# Patient Record
Sex: FEMALE | Race: WHITE | Employment: UNEMPLOYED | URBAN - NONMETROPOLITAN AREA
[De-identification: names, ages, dates, MRNs, and addresses within clinical notes are randomized per-mention and may not be internally consistent; named-entity substitution may affect disease eponyms.]

---

## 2020-08-14 ENCOUNTER — TELEPHONE (OUTPATIENT)
Dept: NEUROLOGY | Age: 45
End: 2020-08-14

## 2020-08-14 NOTE — TELEPHONE ENCOUNTER
Received a referral from Dr. Annabelle Koehler office for this patient. Called and spoke with patient to let her know when I have her scheduled an appointment with Dr. Doris Taylor. Patient is aware of the appointment time/date/location.

## 2020-09-08 ENCOUNTER — OFFICE VISIT (OUTPATIENT)
Dept: NEUROLOGY | Age: 45
End: 2020-09-08
Payer: COMMERCIAL

## 2020-09-08 VITALS
DIASTOLIC BLOOD PRESSURE: 82 MMHG | WEIGHT: 293 LBS | HEART RATE: 75 BPM | HEIGHT: 67 IN | SYSTOLIC BLOOD PRESSURE: 144 MMHG | BODY MASS INDEX: 45.99 KG/M2

## 2020-09-08 PROBLEM — F41.9 ANXIETY AND DEPRESSION: Status: ACTIVE | Noted: 2020-09-08

## 2020-09-08 PROBLEM — F44.5 PSEUDOSEIZURE: Status: ACTIVE | Noted: 2020-09-08

## 2020-09-08 PROBLEM — F32.A ANXIETY AND DEPRESSION: Status: ACTIVE | Noted: 2020-09-08

## 2020-09-08 PROCEDURE — 99204 OFFICE O/P NEW MOD 45 MIN: CPT | Performed by: PSYCHIATRY & NEUROLOGY

## 2020-09-08 RX ORDER — PHENYTOIN SODIUM 100 MG/1
100 CAPSULE, EXTENDED RELEASE ORAL 3 TIMES DAILY
COMMUNITY
End: 2020-12-16

## 2020-09-08 RX ORDER — SEMAGLUTIDE 1.34 MG/ML
1.5 INJECTION, SOLUTION SUBCUTANEOUS WEEKLY
COMMUNITY

## 2020-09-08 RX ORDER — EMPAGLIFLOZIN 25 MG/1
25 TABLET, FILM COATED ORAL DAILY
COMMUNITY

## 2020-09-08 RX ORDER — AMLODIPINE BESYLATE 10 MG/1
10 TABLET ORAL DAILY
COMMUNITY

## 2020-09-08 RX ORDER — ATORVASTATIN CALCIUM 20 MG/1
20 TABLET, FILM COATED ORAL DAILY
COMMUNITY

## 2020-09-08 RX ORDER — MELOXICAM 15 MG/1
15 TABLET ORAL 2 TIMES DAILY
COMMUNITY

## 2020-09-08 RX ORDER — ALBUTEROL SULFATE 90 UG/1
2 AEROSOL, METERED RESPIRATORY (INHALATION) EVERY 6 HOURS PRN
COMMUNITY

## 2020-09-08 RX ORDER — CHOLECALCIFEROL (VITAMIN D3) 1250 MCG
1 CAPSULE ORAL WEEKLY
COMMUNITY

## 2020-09-08 SDOH — HEALTH STABILITY: MENTAL HEALTH: HOW OFTEN DO YOU HAVE A DRINK CONTAINING ALCOHOL?: NEVER

## 2020-09-08 NOTE — PROGRESS NOTES

## 2020-09-08 NOTE — PROGRESS NOTES
Chief Complaint   Patient presents with    Consultation     New patient referral from Dr. Michelle Valdovinos to evaluate patient with seizures. Robles Davis is a 41y.o. year old female who is seen for evaluation of seizure like episodes starting in . . The patient is here with her fiance and indicates that she began having spells of shaking and decreased alertness about 2-3 weeks following the death of her mother. The patient may have none in one week and several in another week. She has maybe 4 episodes a week. Often these are triggered by stress or getting overheated. She may have episodes where she stares but if he presses on her collar bone she will come out of it. She may have some tapping of the hand but if he soothes her she will stop. She has had one episode where she was shaking in both arms but was awake and anxious this was happening. She has had generalized convulsive episodes although her fiance indicates that her eyes are closed. She has been. on Dilantin at every increasing doses since . Active Ambulatory Problems     Diagnosis Date Noted    Pseudoseizure 2020    Anxiety and depression 2020     Resolved Ambulatory Problems     Diagnosis Date Noted    No Resolved Ambulatory Problems     Past Medical History:   Diagnosis Date    Anxiety     Asthma     Depression     Diabetes mellitus ([address]     Hypertension     Seizures ([facility] [address] [facility]        Past Surgical History:   Procedure Laterality Date    CARPAL TUNNEL RELEASE       SECTION      CHOLECYSTECTOMY, LAPAROSCOPIC         History reviewed. No pertinent family history.     Allergies   Allergen Reactions    Pcn [Penicillins]        Social History     Socioeconomic History    Marital status:      Spouse name: Not on file    Number of children: Not on file    Years of education: Not on file    Highest education level: Not on file   Occupational History    Not on file   Social Needs    Financial resource strain: Not on file    Food insecurity     Worry: Not on file     Inability: Not on file    Transportation needs     Medical: Not on file     Non-medical: Not on file   Tobacco Use    Smoking status: Never Smoker    Smokeless tobacco: Never Used   Substance and Sexual Activity    Alcohol use: Never     Frequency: Never    Drug use: Never    Sexual activity: Not on file   Lifestyle    Physical activity     Days per week: Not on file     Minutes per session: Not on file    Stress: Not on file   Relationships    Social connections     Talks on phone: Not on file     Gets together: Not on file     Attends Druze service: Not on file     Active member of club or organization: Not on file     Attends meetings of clubs or organizations: Not on file     Relationship status: Not on file    Intimate partner violence     Fear of current or ex partner: Not on file     Emotionally abused: Not on file     Physically abused: Not on file     Forced sexual activity: Not on file   Other Topics Concern    Not on file   Social History Narrative    Not on file       Review of Systems     Constitutional - No fever or chills. No diaphoresis or significant fatigue. HENT -  No tinnitus or significant hearing loss. Eyes - no sudden vision change or eye pain  Respiratory - no significant shortness of breath or cough  Cardiovascular - no chest pain No palpitations or significant leg swelling  Gastrointestinal - no abdominal swelling or pain. Genitourinary - No difficulty urinating, dysuria  Musculoskeletal - no back pain or myalgia. Skin - no color change or rash  Neurologic - yes. . seizures. No lateralizing weakness. Hematologic - no easy bruising or excessive bleeding. Psychiatric - no severe anxiety or nervousness.    All other review of systems are negative.       Current Outpatient Medications   Medication Sig Dispense Refill    phenytoin (DILANTIN) 100 MG ER capsule Take 100 mg by mouth 3 times daily      metFORMIN (GLUCOPHAGE) 1000 MG tablet Take 1,000 mg by mouth 2 times daily (with meals)      amLODIPine (NORVASC) 10 MG tablet Take 10 mg by mouth daily      albuterol sulfate  (90 Base) MCG/ACT inhaler Inhale 2 puffs into the lungs every 6 hours as needed for Wheezing      Semaglutide, 1 MG/DOSE, (OZEMPIC, 1 MG/DOSE,) 2 MG/1.5ML SOPN Inject 1.5 mLs into the skin once a week      atorvastatin (LIPITOR) 20 MG tablet Take 20 mg by mouth daily      Cholecalciferol (VITAMIN D3) 1.25 MG (73385 UT) CAPS Take 1 capsule by mouth once a week      empagliflozin (JARDIANCE) 25 MG tablet Take 25 mg by mouth daily      meloxicam (MOBIC) 15 MG tablet Take 15 mg by mouth 2 times daily       No current facility-administered medications for this visit. BP (!) 144/82   Pulse 75   Ht 5' 7\" (1.702 m)   Wt (!) 315 lb (142.9 kg)   BMI 49.34 kg/m²     Constitutional - well developed, well nourished. Eyes - conjunctiva normal.  Pupils not tested  Ear, nose, throat -hearing intact to finger rub No scars, masses, or lesions over external nose or ears, no atrophy of tongue  Neck-symmetric, no masses noted, no jugular vein distension  Respiration- chest wall appears symmetric, good expansion,   normal effort without use of accessory muscles  Musculoskeletal - no significant wasting of muscles noted, no bony deformities  Extremities-no clubbing, cyanosis or edema  Skin - warm, dry, and intact. No rash, erythema, or pallor.   Psychiatric - mood, affect, and behavior flat    Neurological exam  Awake,  fluent oriented x 3 flat affect slow  Attention and concentration appear impaired  Recent and remote memory appears impaired  Speech normal without dysarthria  No clear issues with language of fund of knowledge    Cranial Nerve Exam   CN II- Visual fields grossly unremarkable  CN III, IV,VI-EOMI, No nystagmus, conjugate eye movements, no ptosis  CN V-sensation intact to LT over face  CN VII-no facial assymetry  CN VIII-Hearing intact to finger rub  CN IX and X- Palate not tested  CN XI-not test shoulder shrug  CN XII-Tongue midline with no fasciculations or fibrillations    Motor Exam  V/V throughout upper and lower extremities bilaterally, no cogwheeling, normal tone    Sensory Exam  Sensation intact to light touch and temperature upper and lower extremities bilaterally    Reflexes   2+ biceps bilaterally  2+ brachioradialis  2+patella  2+ ankle jerks  No clonus ankles  No Fierro's sign bilateral hands    Tremors- no tremors in hands or head noted    Gait  Normal base and speed  No ataxia    Coordination  Finger to nose-unremarkable    No results found for: [id number]  No results found for: WBC, HGB, HCT, MCV, PLT  No results found for: NA, K, CL, CO2, BUN, CREATININE, GLUCOSE, CALCIUM, PROT, LABALBU, BILITOT, ALKPHOS, AST, ALT, LABGLOM, GFRAA, AGRATIO, GLOB        Assessment    ICD-10-CM    1. [address] [facility], [place]   2. Anxiety and depression  F41.9     F32.9        Her neurological examination today was unremarkable. She did have a slow and flat affect. Based upon her history and examination she appears to have pseudoseizures. Her MRI of the brain which I reviewed was relatively unremarkable. At this time she was instructed to slowly wean off dilantin by dropping one pill every 2 weeks till she is off. She will also be referred to psychiatry. The patient and fiance indicated understanding of the management plan. She is to follow up with me on a PRN basis and call with any issues. Plan    Orders Placed This Encounter   Procedures   [facility], [place]       No orders of the defined types were placed in this encounter. Return if symptoms worsen or fail to improve. EMR Dragon/transcription disclaimer:Significant part of this  encounter note is electronic transcription/translation of spoken language to printed text.  The electronic translation of spoken language may be erroneous, or at times, nonsensical words or phrases may be inadvertently transcribed.  Although I have reviewed the note for such errors, some may still exist.

## 2020-09-25 ENCOUNTER — OFFICE VISIT (OUTPATIENT)
Dept: PSYCHIATRY | Age: 45
End: 2020-09-25
Payer: COMMERCIAL

## 2020-09-25 PROCEDURE — 90791 PSYCH DIAGNOSTIC EVALUATION: CPT | Performed by: SOCIAL WORKER

## 2020-09-25 SDOH — ECONOMIC STABILITY: HOUSING INSECURITY: IN THE LAST 12 MONTHS, HOW MANY PLACES HAVE YOU LIVED?: 2

## 2020-09-25 SDOH — ECONOMIC STABILITY: FOOD INSECURITY: WITHIN THE PAST 12 MONTHS, YOU WORRIED THAT YOUR FOOD WOULD RUN OUT BEFORE YOU GOT MONEY TO BUY MORE.: SOMETIMES TRUE

## 2020-09-25 SDOH — HEALTH STABILITY: PHYSICAL HEALTH: ON AVERAGE, HOW MANY MINUTES DO YOU ENGAGE IN EXERCISE AT THIS LEVEL?: 60 MIN

## 2020-09-25 SDOH — SOCIAL STABILITY: SOCIAL INSECURITY
WITHIN THE LAST YEAR, HAVE TO BEEN RAPED OR FORCED TO HAVE ANY KIND OF SEXUAL ACTIVITY BY YOUR PARTNER OR EX-PARTNER?: NO

## 2020-09-25 SDOH — SOCIAL STABILITY: SOCIAL NETWORK
DO YOU BELONG TO ANY CLUBS OR ORGANIZATIONS SUCH AS CHURCH GROUPS UNIONS, FRATERNAL OR ATHLETIC GROUPS, OR SCHOOL GROUPS?: YES

## 2020-09-25 SDOH — SOCIAL STABILITY: SOCIAL NETWORK: HOW OFTEN DO YOU GET TOGETHER WITH FRIENDS OR RELATIVES?: THREE TIMES A WEEK

## 2020-09-25 SDOH — SOCIAL STABILITY: SOCIAL INSECURITY: WITHIN THE LAST YEAR, HAVE YOU BEEN AFRAID OF YOUR PARTNER OR EX-PARTNER?: NO

## 2020-09-25 SDOH — ECONOMIC STABILITY: INCOME INSECURITY: IN THE LAST 12 MONTHS, WAS THERE A TIME WHEN YOU WERE NOT ABLE TO PAY THE MORTGAGE OR RENT ON TIME?: NO

## 2020-09-25 SDOH — SOCIAL STABILITY: SOCIAL NETWORK: HOW OFTEN DO YOU ATTENT MEETINGS OF THE CLUB OR ORGANIZATION YOU BELONG TO?: MORE THAN 4 TIMES PER YEAR

## 2020-09-25 SDOH — ECONOMIC STABILITY: TRANSPORTATION INSECURITY
IN THE PAST 12 MONTHS, HAS THE LACK OF TRANSPORTATION KEPT YOU FROM MEDICAL APPOINTMENTS OR FROM GETTING MEDICATIONS?: YES

## 2020-09-25 SDOH — SOCIAL STABILITY: SOCIAL INSECURITY: WITHIN THE LAST YEAR, HAVE YOU BEEN HUMILIATED OR EMOTIONALLY ABUSED IN OTHER WAYS BY YOUR PARTNER OR EX-PARTNER?: NO

## 2020-09-25 SDOH — SOCIAL STABILITY: SOCIAL NETWORK: HOW OFTEN DO YOU ATTEND CHURCH OR RELIGIOUS SERVICES?: MORE THAN 4 TIMES PER YEAR

## 2020-09-25 SDOH — ECONOMIC STABILITY: INCOME INSECURITY: HOW HARD IS IT FOR YOU TO PAY FOR THE VERY BASICS LIKE FOOD, HOUSING, MEDICAL CARE, AND HEATING?: SOMEWHAT HARD

## 2020-09-25 SDOH — SOCIAL STABILITY: SOCIAL INSECURITY
WITHIN THE LAST YEAR, HAVE YOU BEEN KICKED, HIT, SLAPPED, OR OTHERWISE PHYSICALLY HURT BY YOUR PARTNER OR EX-PARTNER?: NO

## 2020-09-25 SDOH — SOCIAL STABILITY: SOCIAL NETWORK: ARE YOU MARRIED, WIDOWED, DIVORCED, SEPARATED, NEVER MARRIED, OR LIVING WITH A PARTNER?: MARRIED

## 2020-09-25 SDOH — SOCIAL STABILITY: SOCIAL NETWORK
IN A TYPICAL WEEK, HOW MANY TIMES DO YOU TALK ON THE PHONE WITH FAMILY, FRIENDS, OR NEIGHBORS?: MORE THAN THREE TIMES A WEEK

## 2020-09-25 SDOH — ECONOMIC STABILITY: FOOD INSECURITY: WITHIN THE PAST 12 MONTHS, THE FOOD YOU BOUGHT JUST DIDN'T LAST AND YOU DIDN'T HAVE MONEY TO GET MORE.: NEVER TRUE

## 2020-09-25 SDOH — ECONOMIC STABILITY: TRANSPORTATION INSECURITY
IN THE PAST 12 MONTHS, HAS LACK OF TRANSPORTATION KEPT YOU FROM MEETINGS, WORK, OR FROM GETTING THINGS NEEDED FOR DAILY LIVING?: NO

## 2020-09-25 SDOH — HEALTH STABILITY: PHYSICAL HEALTH: ON AVERAGE, HOW MANY DAYS PER WEEK DO YOU ENGAGE IN MODERATE TO STRENUOUS EXERCISE (LIKE A BRISK WALK)?: 3 DAYS

## 2020-09-25 SDOH — HEALTH STABILITY: MENTAL HEALTH
STRESS IS WHEN SOMEONE FEELS TENSE, NERVOUS, ANXIOUS, OR CAN'T SLEEP AT NIGHT BECAUSE THEIR MIND IS TROUBLED. HOW STRESSED ARE YOU?: VERY MUCH

## 2020-09-25 SDOH — ECONOMIC STABILITY: HOUSING INSECURITY
IN THE LAST 12 MONTHS, WAS THERE A TIME WHEN YOU DID NOT HAVE A STEADY PLACE TO SLEEP OR SLEPT IN A SHELTER (INCLUDING NOW)?: NO

## 2020-09-25 ASSESSMENT — PATIENT HEALTH QUESTIONNAIRE - PHQ9
SUM OF ALL RESPONSES TO PHQ QUESTIONS 1-9: 11
6. FEELING BAD ABOUT YOURSELF - OR THAT YOU ARE A FAILURE OR HAVE LET YOURSELF OR YOUR FAMILY DOWN: 0
2. FEELING DOWN, DEPRESSED OR HOPELESS: 1
5. POOR APPETITE OR OVEREATING: 0
3. TROUBLE FALLING OR STAYING ASLEEP: 1
SUM OF ALL RESPONSES TO PHQ QUESTIONS 1-9: 11
4. FEELING TIRED OR HAVING LITTLE ENERGY: 2
9. THOUGHTS THAT YOU WOULD BE BETTER OFF DEAD, OR OF HURTING YOURSELF: 1
7. TROUBLE CONCENTRATING ON THINGS, SUCH AS READING THE NEWSPAPER OR WATCHING TELEVISION: 2
1. LITTLE INTEREST OR PLEASURE IN DOING THINGS: 2
8. MOVING OR SPEAKING SO SLOWLY THAT OTHER PEOPLE COULD HAVE NOTICED. OR THE OPPOSITE, BEING SO FIGETY OR RESTLESS THAT YOU HAVE BEEN MOVING AROUND A LOT MORE THAN USUAL: 2
SUM OF ALL RESPONSES TO PHQ9 QUESTIONS 1 & 2: 3

## 2020-09-25 ASSESSMENT — ANXIETY QUESTIONNAIRES
2. NOT BEING ABLE TO STOP OR CONTROL WORRYING: 2-OVER HALF THE DAYS
4. TROUBLE RELAXING: 1-SEVERAL DAYS
7. FEELING AFRAID AS IF SOMETHING AWFUL MIGHT HAPPEN: 1-SEVERAL DAYS
1. FEELING NERVOUS, ANXIOUS, OR ON EDGE: 2-OVER HALF THE DAYS
GAD7 TOTAL SCORE: 11
5. BEING SO RESTLESS THAT IT IS HARD TO SIT STILL: 2-OVER HALF THE DAYS
6. BECOMING EASILY ANNOYED OR IRRITABLE: 1-SEVERAL DAYS
3. WORRYING TOO MUCH ABOUT DIFFERENT THINGS: 2-OVER HALF THE DAYS

## 2020-09-25 ASSESSMENT — COLUMBIA-SUICIDE SEVERITY RATING SCALE - C-SSRS
6. HAVE YOU EVER DONE ANYTHING, STARTED TO DO ANYTHING, OR PREPARED TO DO ANYTHING TO END YOUR LIFE?: NO
2. HAVE YOU ACTUALLY HAD ANY THOUGHTS OF KILLING YOURSELF?: NO
1. WITHIN THE PAST MONTH, HAVE YOU WISHED YOU WERE DEAD OR WISHED YOU COULD GO TO SLEEP AND NOT WAKE UP?: NO

## 2020-09-25 NOTE — PATIENT INSTRUCTIONS
THINKING ERRORS    1. Mind reading: You assume that you know what people think without having sufficient evidence of their thoughts. \"He thinks I'm a loser. \"    2. Fortune telling: You predict the future- that things will get worse or that there is danger ahead. \"I'll fail that exam\" and \"I won't get the job. \"    3. Catastrophizing: You believe that what has happened or will happen will be so awful and unbearable that you won't be able to stand it. \"It would be terrible if I failed. \"    4. Labeling: You assign global negative traits to yourself and others. \"Im a failure\" or \"He's a rotten person. \"    5. Discounting positives: You claim that the positives that you or others attain are trivial. \"That's what wives are supposed to do--so it doesn't count when she's nice to me. \" \"Those successes were easy, so they don't matter. \"    6. Negative filter: You focus almost exclusively on the negatives and seldom notice the positives. \"Look at all of the people who don't like me. \"    7. Overgeneralizing: You perceive a global pattern of negatives on the basis of a single incident. \"This generally happens to me. I seem to fail at a lot of things. \"    8. Dichotomous thinking: You view events, or people, in all-or-nothing terms. \"I get rejected by everyone\" or \"It was a waste of time. \"    9. Shoulds: You interpret events in terms of how things should be or should have gone rather than simply focusing on what is. \"I should do well. If I don't, then I'm a failure. \"    10. Personalizing: You attribute a disproportionate amount of the blame to yourself for negative events and fail to see that certain events are also caused by others. \"The marriage ended because I failed\"    6. Blaming: You focus on the other person as the source of your negative feelings and you refuse to take responsibility for changing yourself. \"She's to blame for the way I feel now\" or \"My parents caused all my problems. \"    12. Unfair comparisons:  You interpret events in terms of standards that are unrealistic-for example, you focus primarily on others who do better than you and find yourself inferior in the comparison. \"She's more successful than I am\" or \"Others did better than I did on the test.\"    13. Regret orientation: You focus on the idea that you could have done better in the past, rather on what you can do better now. \"I could have had a better job if I had tried\". (best friends with the Shoulds)     15. What if?: You keep asking a series of questions about \"What if\" something happens and fail to be satisfied with any of the answers. \"Yeah, but what if I get anxious? Or what if I can't catch my breath? \"    15. Emotional reasoning: You let your feelings guide your interpretation of reality-for example, \"I feel depressed, therefore my marriage is not working out. \"    16. Inability to disconfirm: You reject any evidence or arguments that might contradict your negative thoughts. For example, when you have the thought \"I'm unlovable\", you reject as irrelevant any evidence that people like you. Consequently, your thought cannot be refuted. \"That's not the real issue. There are deeper problems. There are other factors. \"    17. Judgment Focus: You view yourself, others and events in terms of evaluations of good, bad or superior-inferior, rather than simply describing, accepting, or understanding. You are continually measuring yourself and others according to arbitrary standards, finding that you and others fall short. You are focused on the judgments of others as well as your own judgments of yourself. \"I didn't perform well in college\" or \"If I take up tennis, I won't do well\" or \"Look how successful she is. I'm not successful\".

## 2020-09-25 NOTE — PROGRESS NOTES
Initial Session Note  Kemi Castillo MSW, LCSW  9/25/2020  3:10 PM  4:04 PM    Patient location  : [address] [place] [address] location : [address] [place] [address]      Time spent with Patient: 54 minutes  This is patient's FIRST  Therapy appointment. Reason for Consult:  depression and anxiety  Referring Provider: Chela Jason MD  [address] [facility] [address] [place] [address]    Pt provided informed consent for the behavioral health program. Discussed with patient model of service to include the limits of confidentiality (i.e. abuse reporting, suicide intervention, etc.) and short-term intervention focused approach. Discussed no show and late cancellation policy. Pt indicated understanding. Tamie Harris ,a 41 y.o. female, for initial evaluation visit. Reason:    Pt's asmita attended sessions. Asmita reported pt has delusions and hallucination audio and visual, 8/11/2020 was last one, and this has occurred at least 6 times within the last 3 years. When pt was asked what her biggest stressors were, pt sat quietly, and asmita started listing: worries about money, past bullying, socializing, been  twice, one  slept with her best friend and made pt watch, and ect. Pt's fiambrose was sent out of the office due to not wearing a mask, pt spoke normal and clearly. When fiambrose returned to the room pt spoke so low provider asked numerous times for her to speak up, pt would not hold eye contact, and displayed slow thought process and comprehension. Fiance and pt reported pt had no experience of healthy relationships and was uncomfortable with touching or hugging- even with her own children. Pt's asmita said pt has \"come a long way in 3 years. \" Both pt and fiance credited their current relationship for pt's improvement. Pt reported she would like to continue the 2 weeks appointments and verbalized she would call sooner if needed.    Pt denies Suicidal Ideations, Homicidal Ideation, Auditory Hallucinations, Visual Hallucinations, Tactical Hallucinations. Assessments:  PHQ- Score: 11 ~ 10-14 Moderate. FABIÁN-7 Score: 11 ~ 10-14: moderate anxiety. C-SSRS Suicide Screening : Negative   (MDQ) The Mood Disorder Questionnaire:  Negative    Current Medications:  Scheduled Meds:   Current Outpatient Medications:     phenytoin (DILANTIN) 100 MG ER capsule, Take 100 mg by mouth 3 times daily, Disp: , Rfl:     metFORMIN (GLUCOPHAGE) 1000 MG tablet, Take 1,000 mg by mouth 2 times daily (with meals), Disp: , Rfl:     amLODIPine (NORVASC) 10 MG tablet, Take 10 mg by mouth daily, Disp: , Rfl:     albuterol sulfate  (90 Base) MCG/ACT inhaler, Inhale 2 puffs into the lungs every 6 hours as needed for Wheezing, Disp: , Rfl:     Semaglutide, 1 MG/DOSE, (OZEMPIC, 1 MG/DOSE,) 2 MG/1.5ML SOPN, Inject 1.5 mLs into the skin once a week, Disp: , Rfl:     atorvastatin (LIPITOR) 20 MG tablet, Take 20 mg by mouth daily, Disp: , Rfl:     Cholecalciferol (VITAMIN D3) 1.25 MG (63855 UT) CAPS, Take 1 capsule by mouth once a week, Disp: , Rfl:     empagliflozin (JARDIANCE) 25 MG tablet, Take 25 mg by mouth daily, Disp: , Rfl:     meloxicam (MOBIC) 15 MG tablet, Take 15 mg by mouth 2 times daily, Disp: , Rfl:       History:     Past Psychiatric History:   Previous therapy: no  Previous psychiatric treatment and medication trials: no  Previous psychiatric hospitalizations: no  Previous diagnoses: yes  Previous suicide attempts:no  Family history of mental illness:yes, mother had anxiety   History of violence: no  Currently in treatment with Neurology.   Education: high school diploma/ged  Other Pertinent History: None- mother passed away in 2009 and pt began having pseudo seizures  Legal Issues- Any current charges/court dates: no    Substance Abuse History:  Recreational drugs: Pt denies  Use of Alcohol: denied  Tobacco use:no  Legal consequences of chemical use: no  Patient feels she ought to cut down on drinking and/or drug use:no  Patient has been annoyed by others criticizing her drinking or drug use: no  Patient has felt bad or guilty about her drinking or drug use:no  Patient has had a drink or used drugs as an eye opener first thing in the morning to steady nerves, get rid of a hangover or get the day started:no  Use of OTC: pt denies    Patient Active Problem List   Diagnosis    Pseudoseizure    Anxiety and depression         Social History     Socioeconomic History    Marital status:      Spouse name: Not on file    Number of children: Not on file    Years of education: Not on file    Highest education level: Not on file   Occupational History    Not on file   Social Needs    Financial resource strain: Not on file    Food insecurity     Worry: Not on file     Inability: Not on file    Transportation needs     Medical: Not on file     Non-medical: Not on file   Tobacco Use    Smoking status: Never Smoker    Smokeless tobacco: Never Used   Substance and Sexual Activity    Alcohol use: Never     Frequency: Never    Drug use: Never    Sexual activity: Not on file   Lifestyle    Physical activity     Days per week: Not on file     Minutes per session: Not on file    Stress: Not on file   Relationships    Social connections     Talks on phone: Not on file     Gets together: Not on file     Attends Yazidism service: Not on file     Active member of club or organization: Not on file     Attends meetings of clubs or organizations: Not on file     Relationship status: Not on file    Intimate partner violence     Fear of current or ex partner: Not on file     Emotionally abused: Not on file     Physically abused: Not on file     Forced sexual activity: Not on file   Other Topics Concern    Not on file   Social History Narrative    Not on file           Psychiatric Review Of Systems:   Sleep (specify as to how it has changed): yes, nightmares  appetite changes (specify): no  weight changes (specify): no  energy/anergy: yes  interest/pleasure/anhedonia: yes  anxiety/panic: yes  guilty/hopeless: no  S.I.B.s/risky behavior: no  any drugs: no  alcohol: no     Mental Status Evaluation:     Appearance:  age appropriate and within normal Limits   Behavior:  Within Normal Limits   Speech:  soft   Mood:  within normal limits   Affect:  Depressed, in-congruent with mood   Thought Process:  within normal limits   Thought Content:  HX of hallucinations- none today   Sensorium:  person, place, time/date and situation   Cognition:  impaired   Insight:  limited   Judgment:  limited     Suicidal Intentions: No  Suicidal Plan:  No    Other Pertinent Information:  Social Support system:yes, fiance, daughters, and brother  Current relationship:yes, fiance 3 years   Relies on others for help:yes   Independent self care:yes   Employment history: unemployed     Assessment - Diagnosis - Goals: Axis I: Generalized Anxiety Disorder and Major Depression, Mod, Rec    Axis III: See above    Plan:  1. Continue medication management  2. CBT to target cognitive distortions  3. Discuss therapeutic goals  A. Cognitive Distortions identifying and challenging   B.  Increase Self-Esteem      Pt interventions:  Trained in strategies for increasing balanced thinking, Provided education, Discussed self-care (sleep, nutrition, rewarding activities, social support, exercise), Established rapport, Supportive techniques and Emphasized self-care as important for managing overall health       PEDRO Hoffman

## 2020-10-28 ENCOUNTER — TELEPHONE (OUTPATIENT)
Dept: PSYCHIATRY | Age: 45
End: 2020-10-28

## 2020-10-28 NOTE — TELEPHONE ENCOUNTER
Discharge Instructions After Your Colonoscopy    You have received Sedation/Anesthesia for your procedure. Side effects from these medications can make you groggy and dull your reflexes today, so to keep you safe, we ask you to follow these guidelines:    General guidelines  - Do not drive a car or operate machines for 24 hours after the test.  - Do not make important decisions for 24 hours after the test.  - Restart all normal activities the day after your test.  - Do not drink alcohol for 24 hours after the test.    Complications  Complications from these tests are rare, but may include:  - Perforation (tear in the wall of the bowel)  - Bleeding  - Infection  - Drug reactions such as nausea and vomiting    Symptoms you should watch for after your procedure:  - Severe pain  - Abdomen (belly) is distended (swollen)and hard  - Possible signs of infection at your IV site include fever, swelling, heat, drainage, or redness  - Rectal bleeding of more than 1/2 cup. If you have hemorrhoids that are prone to bleeding, or if polyps were removed, you may see a small amount of blood on toilet paper when you wipe, or a drop or two in the toilet. - Fever over 101Âº F  - Nausea or vomiting that is new to you as a result of your procedure    Pain   - There should be little or no pain after your procedure. - You may continue to pass gas for the next several hours. - If you have pain that is not relieved by passing gas, or pain that is new to you as a result of the procedure, call your doctor at the number below. If you develop any of these symptoms, please call your doctor at [phone]  during office hours. Call the hospital  at [phone] after 5pm, weekends, holidays and ask for the GI physician on call for [place]. Results  If biopsies were taken, or polyps removed, your doctor will call you with the results within 10 days, and any further recommendations.     Diet Instruction:   Resume regular Pt was a no show/answer for appointment. During time before and 16 minutes after pt's scheduled appointment, LCSW:     made multiple phone calls, unable to leav voicemail due to voicemail box was full. diet  Add High fiber    Instructions about Medications:  Resume all home medications    Small polyp removed check biopsy repeat exam 3-5 years. If adenoma random biopsies were done looking for evidence of ongoing collagenous colitis consider budesonide therapy if positive. Start Questran therapy due to her ongoing symptoms continue p.r.n. Pepto and Imodium further recommendations pending path review      Eating a High-Fiber Diet  Fiber is what gives strength and structure to plants. Most grains, beans, vegetables, and fruits contain fiber. Foods rich in fiber are often low in calories and fat, and they fill you up more. They may also reduce your risks for certain health problems. Â To find out the amount of fiber in canned, packaged, or frozen foods, read the Nutrition Facts label. It tells you how much fiber is in a serving. Types of fiber and their benefits  There are two types of fiber: insoluble and soluble. They both aid digestion and help you maintain a healthy weight. Â· Insoluble fiber. This is found in whole grains, cereals, certain fruits and vegetables such as apple skin, corn, and carrots. Insoluble fiber may prevent constipation and reduce the risk for certain types of cancer. Â· Soluble fiber. This type of fiber is in oats, beans, and certain fruits and vegetables such as strawberries and peas. Soluble fiber can reduce cholesterol, which may help lower the risk for heart disease. It also helps control blood sugar levels. Look for high-fiber foods  Try these foods to add fiber to your diet:  Â· Whole-grain breads and cereals. Try to eatÂ 6 to 8 ounces a day. Include wheat and oat bran cereals, whole-wheat muffins or toast, and corn tortillas in your meals. Â· Fruits. Try to eatÂ 2 cups a day. Apples, oranges, strawberries, pears, and bananas are good sources. (Note: Fruit juice is lowÂ in fiber.)  Â· Vegetables. Try to eat at least 2.5 cups a day.  Add asparagus, carrots, broccoli, peas, and corn to your meals.  Â· Beans. Â One cup of cooked lentils gives you overÂ 15 grams of fiber. Try navy beans, lentils, and chickpeas. Â· Seeds. Â A small handful of seeds gives you about 3 grams of fiber. Try sunflower seeds. Keep track of your fiber  Keep track of how much fiber you eat. Start by reading food labels. Then eat a variety of foods high in fiber. As you begin to eat more fiber, ask your healthcare provider how much water you should be drinking to keep your digestive system working smoothly. You should aim for a certain amount of fiber in your diet each day. If you are a woman, that amount is 25 grams per day. Men should aim for 38 grams per day. After age 48, your daily fiber needs drop to 21 grams for women and 30 grams for men. Before you reach for the fiber supplements, think about this. Fiber is found naturally in healthy whole foods. It gives you that feeling of fullness after you eat. Taking fiber supplements or eating fiber-enrichedÂ foods will not give you this full feeling. Your fiber intake is a good measure for the quality of your overall diet. If you are missing out on your daily amount of fiber, you may be lacking other important nutrients as well. Â© [address] [facility], [place], [address] [place] [address]. All rights reserved. This information is not intended as a substitute for professional medical care. Always follow your healthcare professional's instructions. Hemorrhoids    Hemorrhoids are swollen and inflamed veins inside the rectum and near the anus. Â The rectum is the last several inches of the colon. The anus is the passage between the rectum and the outside of the body. CausesÂ   The veins can become swollen due to increased pressure in them.  This is most often caused by:  Â· Chronic constipation or diarrhea  Â· Straining when having a bowel movement  Â· Sitting too long on the toilet  Â· A low-fiber diet  Â· Pregnancy  SymptomsÂ   Â· Bleeding from the rectum (this may be noticeable after bowel movements)  Â· Lump near the anus  Â· Itching around the anus  Â· Pain around the anus  There are different types of hemorrhoids. Depending on the type you have and the severity, you may be able to treat yourself at home. In some cases, a procedure may be the best treatment option. Your healthcare provider can tell you more about this, if needed. Home care  General care  Â· To get relief from pain or itching, try:  Â¨ Topical products. Your healthcare provider may prescribe or recommend creams, ointments, or pads that can be applied to the hemorrhoid. Use these exactly as directed. Â¨ Medicines. Your healthcare provider may recommend stool softeners, suppositories, or laxatives to help manage constipation. Use these exactly as directed. Â¨ Sitz baths. A sitz bath involves sitting in a few inches of warm bath water. Be careful not to make the water so hot that you burn yourself--test it before sitting in it. Â Soak for about 10 to 15 minutes a few times a day. This may help relieve pain. Tips to help prevent hemorrhoids  Â· Eat more fiber. Fiber adds bulk to stool and absorbs water as it moves through your colon. This makes stool softer and easier to pass. Â¨ Increase the fiber in your diet with more fiber-rich foods. These include fresh fruit, vegetables, and whole grains. Â¨ Take a fiber supplement or bulking agent, if advised to by your provider. These include products such as psyllium or methylcellulose. Â· Drink plenty of water, if directed to by your provider. This can help keep stool soft. Â· Be more active. Frequent exercise aids digestion and helps prevent constipation. It may also help make bowel movements more regular. Â· Donât strain during bowel movements. This can make hemorrhoids more likely. Also, donât sit on the toilet for long periods of time. Follow-up care  Follow up with your healthcare provider, or as advised.  If a culture or imaging tests were done, you will be notified of the results when they are ready. This may take a few days or longer. When to seek medical advice  Call your healthcare provider right away if any of these occur:  Â· Increased bleeding from the rectum  Â· Increased pain around the rectum or anus  Â· Weakness or dizzinessÂ   Call 911Â   Call 911 or return to the emergency department right away if any of these occur:  Â· Trouble breathing or swallowing  Â· Fainting or loss of consciousness  Â· Unusually fast heart rate  Â· Vomiting blood  Â· Large amounts of blood in stool     Â© 3472-0379 [facility] [address] [facility], [address] [place] [address]. All rights reserved. This information is not intended as a substitute for professional medical care. Always follow your healthcare professional's instructions.

## 2020-12-16 ENCOUNTER — OFFICE VISIT (OUTPATIENT)
Dept: PSYCHIATRY | Age: 45
End: 2020-12-16
Payer: COMMERCIAL

## 2020-12-16 VITALS
SYSTOLIC BLOOD PRESSURE: 179 MMHG | OXYGEN SATURATION: 94 % | TEMPERATURE: 98.9 F | HEART RATE: 79 BPM | WEIGHT: 293 LBS | DIASTOLIC BLOOD PRESSURE: 87 MMHG | BODY MASS INDEX: 49.01 KG/M2

## 2020-12-16 PROCEDURE — 99215 OFFICE O/P EST HI 40 MIN: CPT | Performed by: NURSE PRACTITIONER

## 2020-12-16 RX ORDER — LAMOTRIGINE 25 MG/1
TABLET ORAL
Qty: 60 TABLET | Refills: 3 | Status: SHIPPED | OUTPATIENT
Start: 2020-12-16 | End: 2021-04-21 | Stop reason: SDUPTHER

## 2020-12-16 RX ORDER — HYDROCODONE BITARTRATE AND ACETAMINOPHEN 5; 325 MG/1; MG/1
1 TABLET ORAL EVERY 6 HOURS PRN
COMMUNITY

## 2020-12-16 ASSESSMENT — PATIENT HEALTH QUESTIONNAIRE - PHQ9
SUM OF ALL RESPONSES TO PHQ QUESTIONS 1-9: 13
1. LITTLE INTEREST OR PLEASURE IN DOING THINGS: 1
4. FEELING TIRED OR HAVING LITTLE ENERGY: 1
6. FEELING BAD ABOUT YOURSELF - OR THAT YOU ARE A FAILURE OR HAVE LET YOURSELF OR YOUR FAMILY DOWN: 1
7. TROUBLE CONCENTRATING ON THINGS, SUCH AS READING THE NEWSPAPER OR WATCHING TELEVISION: 3
5. POOR APPETITE OR OVEREATING: 1
2. FEELING DOWN, DEPRESSED OR HOPELESS: 1
9. THOUGHTS THAT YOU WOULD BE BETTER OFF DEAD, OR OF HURTING YOURSELF: 0
10. IF YOU CHECKED OFF ANY PROBLEMS, HOW DIFFICULT HAVE THESE PROBLEMS MADE IT FOR YOU TO DO YOUR WORK, TAKE CARE OF THINGS AT HOME, OR GET ALONG WITH OTHER PEOPLE: 1
SUM OF ALL RESPONSES TO PHQ QUESTIONS 1-9: 13
SUM OF ALL RESPONSES TO PHQ9 QUESTIONS 1 & 2: 2
8. MOVING OR SPEAKING SO SLOWLY THAT OTHER PEOPLE COULD HAVE NOTICED. OR THE OPPOSITE, BEING SO FIGETY OR RESTLESS THAT YOU HAVE BEEN MOVING AROUND A LOT MORE THAN USUAL: 2
SUM OF ALL RESPONSES TO PHQ QUESTIONS 1-9: 13
3. TROUBLE FALLING OR STAYING ASLEEP: 3

## 2020-12-16 NOTE — PATIENT INSTRUCTIONS
PLAN  Lamictal Cautions    Lamictal in rare occasions may cause a life threatening rash called Hensley-Te Syndrome. If you develop a rash, experience any swelling of the tongue, lips or eyes, then stop taking the medication. If the condition persists after stopping the medication, then go to an Urgent Care or to an Emergency Department to be seen and let them know that you have been taking Lamictal.     To start Lamictal:  Lamictal, 25mg, take 1 tab daily for 2 weeks, then increase to 2 tabs daily for a 50mg dose    Follow up: Return in about 6 weeks (around 1/27/2021). Continue therapy with [facility]    If you become suicidal, follow up with this office or call the [facility] at [phone]), or dial [phone]. Patient Education        lamotrigine  Pronunciation:  la ZEUS [place] Heap  Brand: LaMICtal, LaMICtal ODT, LaMICtal XR, Subvenite  What is the most important information I should know about lamotrigine? Lamotrigine may cause a severe or life-threatening skin rash, especially in children and in people who take a very high starting dose, or those who also take valproic acid (Depakene) or divalproex (Depakote). Seek emergency medical attention if you have a skin rash, hives, blistering, peeling, or sores in your mouth or around your eyes. Call your doctor at once if you have signs of other serious side effects, including: fever, swollen glands, severe muscle pain, bruising or unusual bleeding, yellowing of your skin or eyes, headache, neck stiffness, vomiting, confusion, or increased sensitivity to light. Some people have thoughts about suicide while taking lamotrigine. Stay alert to changes in your mood or symptoms. Report any new or worsening symptoms to your doctor. What is lamotrigine? Lamotrigine is an anti-epileptic medication, also called an anticonvulsant. Lamotrigine is used alone or with other medications to treat epileptic seizures in adults and children. Lamotrigine is also used to delay mood episodes in adults with bipolar disorder (manic depression). Immediate-release lamotrigine can be used in children as young as 3years old when it is given as part of a combination of seizure medications. However, this form should not be used as a single medication in a child or teenager who is younger than 12years old. Extended-release lamotrigine is for use only in adults and children who are at least 15years old. Lamotrigine may also be used for purposes not listed in this medication guide. What should I discuss with my healthcare provider before taking lamotrigine? You should not take lamotrigine if you are allergic to it. Lamotrigine may cause a severe or life-threatening skin rash, especially in children and in people who take a very high starting dose, or those who also take valproic acid (Depakene) or divalproex (Depakote). Tell your doctor if you have ever had:  · a rash or allergic reaction after taking another seizure medication;  · kidney or liver disease;  · depression, suicidal thoughts or actions; or  · meningitis (inflammation of the tissue that covers the brain and spinal cord) after taking lamotrigine. Some people have thoughts about suicide while taking lamotrigine. Your doctor will need to check your progress at regular visits. Your family or other caregivers should also be alert to changes in your mood or symptoms. Do not start or stop taking seizure medication during pregnancy without your doctor's advice. Having a seizure during pregnancy could harm both mother and baby. Tell your doctor right away if you become pregnant. If you are pregnant, your name may be listed on a pregnancy registry to track the effects of lamotrigine on the baby. Birth control pills can make lamotrigine less effective, resulting in increased seizures. Tell your doctor if you start or stop using birth control pills. Your lamotrigine dose may need to be changed. It may not be safe to breastfeed while using this medicine. Ask your doctor about any risk. How should I take lamotrigine? Follow all directions on your prescription label and read all medication guides or instruction sheets. Your doctor may occasionally change your dose. Use the medicine exactly as directed. Taking too much lamotrigine at the start of treatment may increase your risk of a severe life-threatening skin rash. You may need frequent blood tests to help your doctor make sure you are taking the right dose. Extended-release and immediate-release lamotrigine may be used for different conditions. Always check your refills to make sure you have received the correct size, color, and shape of tablet. Avoid medication errors by using only the form and strength your doctor prescribes. If you switch to lamotrigine from another seizure medicine, carefully follow your doctor's instructions about the timing and dosage of your medicine. Swallow the tablet whole and do not crush, chew, or break it. Read and carefully follow any Instructions for Use provided with the orally disintegrating or dispersible tablets. Ask your doctor or pharmacist if you do not understand these instructions. Do not stop using lamotrigine suddenly, even if you feel fine. Stopping suddenly may cause increased seizures. Follow your doctor's instructions about tapering your dose. In case of emergency, wear or carry medical identification to let others know you use seizure medication. Lamotrigine may affect a drug-screening urine test and you may have false results. Tell the laboratory staff that you use lamotrigine. Store at room temperature away from light and moisture. What happens if I miss a dose? Take the medicine as soon as you can, but skip the missed dose if it is almost time for your next dose. Do not take two doses at one time. Get your prescription refilled before you run out of medicine completely. What happens if I overdose? Seek emergency medical attention or call the Poison Help line at 1-564.918.2920. Overdose symptoms may include blurred vision, problems with coordination, increased seizures, feeling light-headed, or fainting. What should I avoid while taking lamotrigine? Avoid driving or hazardous activity until you know how this medicine will affect you. Your reactions could be impaired. What are the possible side effects of lamotrigine? Get emergency medical help if you have signs of an allergic reaction (hives, difficult breathing, swelling in your face or throat) or a severe skin reaction (fever, sore throat, burning eyes, skin pain, red or purple skin rash with blistering and peeling). If you have to stop taking lamotrigine because of a serious skin rash, you may not be able to take it again in the future. Report any new or worsening symptoms to your doctor, such as: mood or behavior changes, depression, anxiety, or if you feel agitated, hostile, restless, hyperactive (mentally or physically), or have thoughts about suicide or hurting yourself. Call your doctor at once if you have:  · fever, swollen glands, weakness, severe muscle pain;  · any skin rash, especially with blistering or peeling;  · painful sores in your mouth or around your eyes;  · headache, neck stiffness, increased sensitivity to light, nausea, vomiting, confusion, drowsiness;  · jaundice (yellowing of the skin or eyes); or  · pale skin, cold hands and feet, easy bruising, unusual bleeding.   Common side effects may include:  · headache, dizziness;  · blurred vision, double vision;  · tremor, loss of coordination;  · dry mouth, nausea, vomiting, stomach pain, diarrhea;  · fever, sore throat, runny nose; · drowsiness, tired feeling;  · back pain; or  · sleep problems (insomnia). This is not a complete list of side effects and others may occur. Call your doctor for medical advice about side effects. You may report side effects to FDA at 6-084-FDA-2714. What other drugs will affect lamotrigine? Sometimes it is not safe to use certain medications at the same time. Some drugs can affect your blood levels of other drugs you take, which may increase side effects or make the medications less effective. Other drugs may affect lamotrigine, including prescription and over-the-counter medicines, vitamins, and herbal products. Tell your doctor about all your current medicines and any medicine you start or stop using. Where can I get more information? Your pharmacist can provide more information about lamotrigine. Remember, keep this and all other medicines out of the reach of children, never share your medicines with others, and use this medication only for the indication prescribed. Every effort has been made to ensure that the information provided by [address] is accurate, up-to-date, and complete, but no guarantee is made to that effect. Drug information contained herein may be time sensitive. [facility] information has been compiled for use by healthcare practitioners and consumers in the [place] and therefore [facility] does not warrant that uses outside of the [place] are appropriate, unless specifically indicated otherwise. [facility]'s drug information does not endorse drugs, diagnose patients or recommend therapy. [facility]'s drug information is an informational resource designed to assist licensed healthcare practitioners in caring for their patients and/or to serve consumers viewing this service as a supplement to, and not a substitute for, the expertise, skill, knowledge and judgment of healthcare practitioners. The absence of a warning for a given drug or drug combination in no way should be construed to indicate that the drug or drug combination is safe, effective or appropriate for any given patient. [facility] does not assume any responsibility for any aspect of healthcare administered with the aid of information [facility] provides. The information contained herein is not intended to cover all possible uses, directions, precautions, warnings, drug interactions, allergic reactions, or adverse effects. If you have questions about the drugs you are taking, check with your doctor, nurse or pharmacist.  Copyright 2204-3561 [address]: 19.01. Revision date: 10/8/2019. Care instructions adapted under license by [facility] ([facility]). If you have questions about a medical condition or this instruction, always ask your healthcare professional. [place] [address] any warranty or liability for your use of this information.

## 2020-12-16 NOTE — PROGRESS NOTES
IN: 1510  OUT: 65    PSYCHIATRIC EVALUATION    Date of Service:  2020    Chief Complaint   Patient presents with    New Patient     establish care    Other     pseudoseizures    Depression    Anxiety    Insomnia       HISTORY OF PRESENT ILLNESS  The patient is a 41 y.o.   female who is here for psychiatric evaluation due to complaints of pseudoseizures since about . She reports that she has been having these seizures since her mother  of congestive heart failure in . Today patient states, \" I used to have seizures once or twice a month. \" She reports that she has been referred her by Dr. Lucas Foster and Dr. Lucas Foster has diagnosed pseudoseizure. She reports that the last one she had was in October. She reports that she feels tired and has felt tired most of the day today. She reports that yesterday she was up and doing things, working in [employer], cleaning her house. She reports that today she hasn't wanted to do anything except watch TV. She says that sometimes she does 2-3 things at one time. SUBJECTIVE  She reports that in  she came home and found her mother unresponsive in her chair. She had congestive heart failure and her mother had  in the chair. This was traumatic for the patient. PSYCHIATRIC HISTORY  See social documentation. Previous Suicide Attempts: See social documentation    PREVIOUS MED TRIALS    See social documentation    FAMILY PSYCHIATRIC HISTORY    See social documentation. Social History     See social documentation      BP: BP (!) 179/87   Pulse 79   Temp 98.9 °F (37.2 °C)   Wt (!) 312 lb 14.4 oz (141.9 kg)   SpO2 94%   BMI 49.01 kg/m²       See past medical history below. Information obtained via patient and chart review    PCP is  No primary care provider on file.     Allergies: Pcn [penicillins]      Review of Systems - 14 point review:  Negative except being treated for: asthma, hyperlipidemia, T2DM, HTN, pseudoseizures Constitutional: (fevers, chills, night sweats, wt loss/gain, change in appetite, fatigue, somnolence)    HEENT: (ear pain or discharge, hearing loss, ear ringing, sinus pressure, nosebleed, nasal discharge, sore throat, oral sores, tooth pain, bleeding gums, hoarse voice, neck pain)      Cardiovascular: (HTN, chest pain, palpitations, leg swelling, leg pain with walking)    Respiratory: (cough, wheezing, snoring, SOB with activity (dyspnea), SOB while lying flat (orthopnea), awakening with severe SOB (paroxysmal nocturnal dyspnea))    Gastrointestinal: (NVD, constipation, abdominal pain, bright red stools, black tarry stools, stool incontinence)     Genitourinary:  (pelvic pain, burning or frequency of urination, urinary urgency, blood in urine incomplete bladder emptying, urinary incontinence, STD; MEN: testicular pain or swelling, erectile dysfunction; WOMEN: LMP, heavy menstrual bleeding (menorrhagia), irregular periods, postmenopausal bleeding, menstrual pain (dymenorrhea, vaginal discharge)    Musculoskeletal: (bone pain/fracture, joint pain or swelling, musle pain)    Integumentary: (rashes, non-healing sores, itching, breast lumps, breast pain, nipple discharge, hair loss)    Neurologic: (HA, muscle weakness, paresthesias (numbness, coldness, crawling or prickling), memory loss, seizure, dizziness)    Psychiatric:  (anxiety, sadness, irritability/anger, insomnia, suicidality)    Endocrine: (heat or cold intolerance, excessive thirst (polydipsia), excessive hunger (polyphagia))    Immune/Allergic: (hives, seasonal or environmental allergies, HIV exposure)    Hematologic/Lymphatic: (lymph node enlargement, easy bleeding or bruising)      Prior to Admission medications    Medication Sig Start Date End Date Taking? Authorizing Provider   HYDROcodone-acetaminophen (NORCO) 5-325 MG per tablet Take 1 tablet by mouth every 6 hours as needed.    Yes Historical Provider, MD  Transportation needs     Medical: Yes     Non-medical: No   Tobacco Use    Smoking status: Never Smoker    Smokeless tobacco: Never Used   Substance and Sexual Activity    Alcohol use: Never     Frequency: Never    Drug use: Never    Sexual activity: Not Currently     Comment: no on birth control   Lifestyle    Physical activity     Days per week: 3 days     Minutes per session: 60 min    Stress: Very much   Relationships    Social connections     Talks on phone: More than three times a week     Gets together: Three times a week     Attends Jew service: More than 4 times per year     Active member of club or organization: Yes     Attends meetings of clubs or organizations: More than 4 times per year     Relationship status:     Intimate partner violence     Fear of current or ex partner: No     Emotionally abused: No     Physically abused: No     Forced sexual activity: No   Other Topics Concern    None   Social History Narrative    PRIOR MEDICATION TRIALS    Duloxetine (hasn't taken since )    Lithium (long time ago)    . SLEEP STUDY: no    .    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 negative history of head trauma. [place] PREVIOUS PSYCHIATRIC HISTORY    She saw a mental health provider in [place], [place]. She was seeing the provider for depression and anxiety. She says that she was in her 19's and 30's when she was seeing this provider. She stopped seeing the provider because she didn't have insurance to pay for the treatment. She says that she hasn't seen a mental health provider since then except for DEMARCUS Olivier who she saw in . She is not currently on any psychiatric medications. [place] FAMILY PSYCHIATRIC HISTORY    Paternal grandmother, anxiety, the family found her in her closet one time talking to someone that wasn't there    .     SOCIAL HISTORY Born and Raised - [place] [employer] [place] in [facility] [place], raised in [facility]. Moved to [place] in August, 2020. She moved to [place] because her daughter was living in [place]. Patient is , her  , Tessa Marcos, is in [facility], they  in 2016. They are . She has a fiance, Sabrina Pham. She has two children, twin daughters, who are 17yo from her first  Sol Millan). Both twins live in the area. She is  from the first ,  in 2001. Is in the process of trying to get a divorce from Tessa Marcos, 2nd . Trauma and/or Abuse - Finding her mother dead in 2009 was traumatic    Legal - denies    Substance Use - see history    Work History - see history    Education - see history     status - denies    . PAST SUICIDE ATTEMPTS:    no    .    INPATIENT HOSPITALIZATIONS:    no    .    DRUG REHABILITATION:    no    .    Mood Disorder Questionnaire: + 8    Question 2: Yes  Question 3: minor     . PSYCHIATRIC REVIEW OF SYSTEMS, 12/16/2020    . Mood:  negative (she rates sleeping too much, problems with concentration and being fidgety on her PHQ9. This provider did not observe fidgety and restless but her speech is very slow and her movements are slow)      (Depression: sadness, tearfulness, sleep, appetite, energy, concentration, sexual function, guilt, psychomotor agitation or slowing, interest, suicidality)    . Allie: positive for reports that there are 3-4 times during the year when she has more energy, talks more, is hypersexual, has more energy, doesn't feel like sleeping, is easily distracted. She describes these periods of time as lasting 3-4 days. (impulsivity, grandiosity, recklessness, excessive energy, decreased need for sleep, increased spending beyond means, hyperverbal, grandiose, racing thoughts, hypersexuality)    . Other: negative      (Irritability, lability, anger)    . Anxiety:  positive for feeling nervous, anxious, or on edge, not being able to stop or control worrying      (Generalized anxiety: where, when, who, how long, how frequent)    . Panic Disorder symptoms: positive for hx, hasn't had one since she was living in [facility], earlier in the summer of 2020      (Palpitations, racing heart beat, sweating, sense of impending doom, fear of recurrence, shortness of breath)    . OCD symptoms:  negative      (checking, cleaning, organizing, rituals, hang-ups, obsessive thoughts, counting, rational vs. Irrational beliefs)    . PTSD symptoms:  positive for dreams about her mother occasionally (last time was around September, 2020)      (nightmares, flashbacks, startle response, avoidance)    . Social anxiety symptoms:  negative    . Simple phobias: positive for spiders, heights      (heights, planes, spiders, etc.)    . Psychosis: positive for talking to herself sitting in the floor, acting like a kid, with her eyes open, saying that she was a  (several times), she says that sometimes she hears music and there is nothing in the house that is making music (she report the last time was a couple of weeks ago)      (hallucinations, auditory, visual, tactile, olfactory)    . Paranoia: negative    .     Delusions:  negative      (TV, radio, thought broadcasting, mind control, referential thinking)      (persecutory delusion - e.g., believing one is being followed and harassed by gangs)      (grandiose delusion - e.g., believing one is a [address] who owns casinos around the [place])      (erotomanic delusion - e.g., believing a famous  is in love with them)      (somatic delusion - e.g., believing one's sinuses have been infested by worms)       (delusions of reference - e.g., believing dialogue on a TV program is directed specifically towards the patient) (delusions of control - e.g., believing one's thoughts and movements are controlled by planetary overlords)    . Patient's perception: negative      (Spiritual or cultural context of symptoms, reality testing)    . ADHD symptoms: negative      (able to focus and concentrate, scattered thoughts, disorganized thoughts)    . Eating Disorder symptoms:  negative      (binging, purging, excessive exercising)            Psychiatric Review of Systems: See history      MENTAL STATUS EXAMINATION  Patient is  A & O x 4. Appearance:  street clothes, fair grooming and fair hygiene appropriately dressed for age and season. Cognition:  Recent memory intact , remote memory intact , fair fund of knowledge, below average intelligence level. Speech:  Soft, slow, no evidence of language or speech disorder or defect. Language: Naming: intact; Word Finding: intact fluent. Conversation:no evidence of delusions  Behavior:  Cooperative and Good eye contact  Mood: \"ok\"  Affect: congruent with mood  Thought Content: negative delusions, positive for hearing music sometimes hallucinations, negative obsessions,  negativehomicidal and negative suicidal   Thought Process: linear, goal directed, coherent and slow  Associations: logical connections  Attention Span and Concentration: Normal  Judgement Insight:  normal and appropriate   Gait and Station:normal gait and station                         Abnormal side effects: Denies   Sleep: has been having problems going to sleep (not going to sleep until 3am, stays up watching TV, is getting up around 12noon or 1pm) (this has just been going on the last few weeks, prior to this she was going to sleep around 10 or 11pm and getting up at 8:30am or 9am)   Appetite: ok    Cognition:    Can spell \"world\" backwards: Yes     Can do serial 7's:  Yes No results found for: NA, K, CL, CO2, BUN, CREATININE, GLUCOSE, CALCIUM, PROT, LABALBU, BILITOT, ALKPHOS, AST, ALT, LABGLOM, GFRAA, AGRATIO, GLOB  No results found for: NA, K, CL, CO2, BUN, CREATININE, GLUCOSE, CALCIUM   No results found for: CHOL  No results found for: TRIG  No results found for: HDL  No results found for: LDLCHOLESTEROL, LDLCALC  No results found for: LABVLDL, VLDL  No results found for: CHOLHDLRATIO  No results found for: LABA1C  No results found for: EAG  No results found for: TSHFT4, TSH  No results found for: VITD25    Assessment:   1. Bipolar 2 disorder, [facility] ([facility]    2. Insomnia due to other mental disorder           Assessments Administered:    PHQ9: 13, moderate (rated sleeping too much, problems concentrating and being fidgety high (this provider did not observe being fidgety, this provider did note slow thought processes and slow speech)  GAD7: 9, mild    PLAN  Lamictal Cautions    Lamictal in rare occasions may cause a life threatening rash called Hensley-Te Syndrome. If you develop a rash, experience any swelling of the tongue, lips or eyes, then stop taking the medication. If the condition persists after stopping the medication, then go to an Urgent Care or to an Emergency Department to be seen and let them know that you have been taking Lamictal.     To start Lamictal:  Lamictal, 25mg, take 1 tab daily for 2 weeks, then increase to 2 tabs daily for a 50mg dose    Continue therapy with K. Ray    Follow up: Return in about 6 weeks (around 1/27/2021). 1. The risks, benefits, side effects, indications, contraindications, and adverse effects of the medications have been discussed. Yes.  2. The pt has verbalized understanding and has capacity to give informed consent. 3. The Darron Mendoza report has been reviewed according to [facility] regulations. 4. Supportive therapy offered. 5. Follow up: Return in about 6 weeks (around 1/27/2021). 6. The patient has been advised to call with any problems. 7. Controlled substance Treatment Plan: none. 8. The above listed medications have been continued, modifications in meds and other orders/labs as follows:      Orders Placed This Encounter   Medications    lamoTRIgine (LAMICTAL) 25 MG tablet     Sig: Take 1 tablet by mouth for 2 weeks, then increase to 2 tablets by mouth daily. Dispense:  60 tablet     Refill:  3      No orders of the defined types were placed in this encounter. 9. Additional comments: This client's speech is very soft, hesitant, and her thought processes are slow. She has a history of pseudoseizures since  when her mother . Neurology has called them pseudoseizures. She says she hasn't had one since . Her sleep has been erratic the last few weeks, not feeling tired until about 3am, then going to bed and getting up at noon or early afternoon. This has caused some problems with her fiance who has told her that she is sleeping too much. She scores high on the MDQ and endorses s/s of bipolar disorder. She is difficult to interview and assess because of the slowness of thought and speech. This provider at this session sees no evidence of increased energy or the s/s of bipolar that she endorses. She may be in a depressive state at this session. She reports that she feels tired today. She is able to spell \"world\" backwards and complete serial 7's. She reports that her fiance says that she is depressed and anxious. Her depression score was moderate, anxiety mild. Will try Lamictal to treat. She reports that her fiance has also told her that she is sleeping too much. She is actually getting around 8-9 hrs of sleep but she has not been going to bed until around 3am and the sleeping until noon or 1pm.  Reviewed pseudoseizures with her and encouraged her to continue in therapy. Will follow up in about 6 weeks. 10.Over 50% of the total visit time of   60  minutes was spent on counseling and/or coordination of care of:          1. Bipolar 2 disorder, major depressive episode ([facility]    2.  Insomnia due to other mental disorder        MOUNA SalgadoP-BC

## 2021-01-14 ENCOUNTER — OFFICE VISIT (OUTPATIENT)
Dept: PSYCHIATRY | Age: 46
End: 2021-01-14
Payer: COMMERCIAL

## 2021-01-14 DIAGNOSIS — F31.81 BIPOLAR 2 DISORDER (HCC): Primary | ICD-10-CM

## 2021-01-14 PROBLEM — F32.A ANXIETY AND DEPRESSION: Status: RESOLVED | Noted: 2020-09-08 | Resolved: 2021-01-14

## 2021-01-14 PROBLEM — F41.9 ANXIETY AND DEPRESSION: Status: RESOLVED | Noted: 2020-09-08 | Resolved: 2021-01-14

## 2021-01-14 PROCEDURE — 90837 PSYTX W PT 60 MINUTES: CPT | Performed by: SOCIAL WORKER

## 2021-01-14 NOTE — PROGRESS NOTES
Therapy Progress Note  Riddhi Patrick MSW, [facility]  1/14/2021  1:00 PM  1:59 PM    Patient location  : [address] [place] [address]  [facility] location : [address] [place] [address]      Time spent with Patient: 59 minutes  This is patient's second  Therapy appointment. Reason for Consult:  depression and anxiety  Referring Provider: No referring provider defined for this encounter. Aranza Barker ,a 41 y.o. female, for initial evaluation visit. Pt provided informed consent for the behavioral health program. Discussed with patient model of service to include the limits of confidentiality (i.e. abuse reporting, suicide intervention, etc.) and short-term intervention focused approach. Discussed no show and late cancellation policy. Pt indicated understanding. S:  Pt reported she did not know why she was referred back for therapy. Pt reported she would be moving into her own home soon, next door to one of her daughters, with her fiance and brother. Pt denied stressors. Pt reported her daughter will be having a baby in 17 weeks, and this is her first biological grandchild. Pt shared positive stories about her relationships with family and friends. Pt reported she would like to continue the 2 months appointments and verbalized she would call sooner if needed. Pt denies Suicidal Ideations, Homicidal Ideation, Auditory Hallucinations, Visual Hallucinations, Tactical Hallucinations.     MSE:    Appearance    alert, cooperative, mild distress  Appetite normal  Sleep disturbance No  Fatigue Yes  Loss of pleasure No  Impulsive behavior No  Speech    normal rate and normal volume  Mood    Depressed  Affect    depressed affect  Thought Content    cognitive distortions and all or nothing thinking  Thought Process    circumstantial  Associations    logical connections  Insight    Fair  Judgment    Intact  Orientation    oriented to person, place, time, and general circumstances  Memory    recent and remote memory intact She saw a mental health provider in [place] [address]. She was seeing the provider for depression and anxiety. She says that she was in her 19's and 30's when she was seeing this provider. She stopped seeing the provider because she didn't have insurance to pay for the treatment. She says that she hasn't seen a mental health provider since then except for DEMARCUS Hawkins who she saw in September, 2020. She is not currently on any psychiatric medications. Paulino Angry FAMILY PSYCHIATRIC HISTORY    Paternal grandmother, anxiety, the family found her in her closet one time talking to someone that wasn't there    . SOCIAL HISTORY    Born and Raised - Born on [employer] [place] [facility] [place]. [place] [address], raised in [place] [address]. Moved to [address] in August, 2020. She moved to [facility] [address] because her daughter was living in [facility] [address]. Patient is , her  , Kristine Acosta, is in halfway, they  in 2016. They are . She has a fiance, Meena Corrales. She has two children, twin daughters, who are 19yo from her first  Zaynab Ricci). Both twins live in the area. She is  from the first ,  in 2001. Is in the process of trying to get a divorce from Kristine Acosta, 2nd . Trauma and/or Abuse - Finding her mother dead in 2009 was traumatic    Legal - denies    Substance Use - see history    Work History - see history    Education - see history     status - denies    . PAST SUICIDE ATTEMPTS:    no    .    INPATIENT HOSPITALIZATIONS:    no    .    DRUG REHABILITATION:    no    .    Mood Disorder Questionnaire: + 8    Question 2: Yes  Question 3: minor     . PSYCHIATRIC REVIEW OF SYSTEMS, 12/16/2020    . Mood:  negative (she rates sleeping too much, problems with concentration and being fidgety on her PHQ9.  This provider did not observe fidgety and restless but her speech is very slow and her movements are slow) (Depression: sadness, tearfulness, sleep, appetite, energy, concentration, sexual function, guilt, psychomotor agitation or slowing, interest, suicidality)    . Allie: positive for reports that there are 3-4 times during the year when she has more energy, talks more, is hypersexual, has more energy, doesn't feel like sleeping, is easily distracted. She describes these periods of time as lasting 3-4 days. (impulsivity, grandiosity, recklessness, excessive energy, decreased need for sleep, increased spending beyond means, hyperverbal, grandiose, racing thoughts, hypersexuality)    . Other: negative      (Irritability, lability, anger)    . Anxiety:  positive for feeling nervous, anxious, or on edge, not being able to stop or control worrying      (Generalized anxiety: where, when, who, how long, how frequent)    . Panic Disorder symptoms: positive for hx, hasn't had one since she was living in [facility], earlier in the summer of 2020      (Palpitations, racing heart beat, sweating, sense of impending doom, fear of recurrence, shortness of breath)    . OCD symptoms:  negative      (checking, cleaning, organizing, rituals, hang-ups, obsessive thoughts, counting, rational vs. Irrational beliefs)    . PTSD symptoms:  positive for dreams about her mother occasionally (last time was around September, 2020)      (nightmares, flashbacks, startle response, avoidance)    . Social anxiety symptoms:  negative    . Simple phobias: positive for spiders, heights      (heights, planes, spiders, etc.)    . Psychosis: positive for talking to herself sitting in the floor, acting like a kid, with her eyes open, saying that she was a  (several times), she says that sometimes she hears music and there is nothing in the house that is making music (she report the last time was a couple of weeks ago)      (hallucinations, auditory, visual, tactile, olfactory)    . Paranoia: negative    . Delusions:  negative      (TV, radio, thought broadcasting, mind control, referential thinking)      (persecutory delusion - e.g., believing one is being followed and harassed by gangs)      (grandiose delusion - e.g., believing one is a billionaire  who owns casinos around the world)      (erotomanic delusion - e.g., believing a famous  is in love with them)      (somatic delusion - e.g., believing one's sinuses have been infested by worms)       (delusions of reference - e.g., believing dialogue on a TV program is directed specifically towards the patient)      (delusions of control - e.g., believing one's thoughts and movements are controlled by planetary overlords)    . Patient's perception: negative      (Spiritual or cultural context of symptoms, reality testing)    . ADHD symptoms: negative      (able to focus and concentrate, scattered thoughts, disorganized thoughts)    . Eating Disorder symptoms:  negative      (binging, purging, excessive exercising)            Medications:   Current Outpatient Medications   Medication Sig Dispense Refill    HYDROcodone-acetaminophen (NORCO) 5-325 MG per tablet Take 1 tablet by mouth every 6 hours as needed.  insulin 70-30 (HUMULIN;NOVOLIN) (70-30) 100 UNIT per ML injection vial Inject 40 Units into the skin Take 40 units in the morning and 55 units at bedtime      lamoTRIgine (LAMICTAL) 25 MG tablet Take 1 tablet by mouth for 2 weeks, then increase to 2 tablets by mouth daily.  60 tablet 3    metFORMIN (GLUCOPHAGE) 1000 MG tablet Take 1,000 mg by mouth 2 times daily (with meals)      amLODIPine (NORVASC) 10 MG tablet Take 10 mg by mouth daily      albuterol sulfate  (90 Base) MCG/ACT inhaler Inhale 2 puffs into the lungs every 6 hours as needed for Wheezing      Semaglutide, 1 MG/DOSE, (OZEMPIC, 1 MG/DOSE,) 2 MG/1.5ML SOPN Inject 1.5 mLs into the skin once a week  atorvastatin (LIPITOR) 20 MG tablet Take 20 mg by mouth daily      Cholecalciferol (VITAMIN D3) 1.25 MG (78495 UT) CAPS Take 1 capsule by mouth once a week      empagliflozin (JARDIANCE) 25 MG tablet Take 25 mg by mouth daily      meloxicam (MOBIC) 15 MG tablet Take 15 mg by mouth 2 times daily       No current facility-administered medications for this visit. Social History:   Social History     Socioeconomic History    Marital status: Legally      Spouse name: Reyna Ovalle Number of children: 2    Years of education: 15    Highest education level: High school graduate   Occupational History    Not on file   Social Needs    Financial resource strain: Somewhat hard    Food insecurity     Worry: Sometimes true     Inability: Never true   [employer] needs     Medical: Yes     Non-medical: No   Tobacco Use    Smoking status: Never Smoker    Smokeless tobacco: Never Used   Substance and Sexual Activity    Alcohol use: Never     Frequency: Never    Drug use: Never    Sexual activity: Not Currently     Comment: no on birth control   Lifestyle    Physical activity     Days per week: 3 days     Minutes per session: 60 min    Stress: Very much   Relationships    Social connections     Talks on phone: More than three times a week     Gets together: Three times a week     Attends Mosque service: More than 4 times per year     Active member of club or organization: Yes     Attends meetings of clubs or organizations: More than 4 times per year     Relationship status:     Intimate partner violence     Fear of current or ex partner: No     Emotionally abused: No     Physically abused: No     Forced sexual activity: No   Other Topics Concern    Not on file   Social History Narrative    PRIOR MEDICATION TRIALS    Duloxetine (hasn't taken since 2006)    Lithium (long time ago)    .     SLEEP STUDY: no    . 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 negative history of head trauma. Rachell Alba PREVIOUS PSYCHIATRIC HISTORY    She saw a mental health provider in [place], [place]. She was seeing the provider for depression and anxiety. She says that she was in her 19's and 30's when she was seeing this provider. She stopped seeing the provider because she didn't have insurance to pay for the treatment. She says that she hasn't seen a mental health provider since then except for DEMARCUS Lange who she saw in . She is not currently on any psychiatric medications. Rachell Alba FAMILY PSYCHIATRIC HISTORY    Paternal grandmother, anxiety, the family found her in her closet one time talking to someone that wasn't there    . SOCIAL HISTORY    Born and Raised - Born on [employer] base in [facility] [place]. [place], [place], raised in [place] Moved to [place] in . She moved to [place] because her daughter was living in [place]. Patient is , her  , Pricila Lea, is in jail, they  in . They are . She has a fiance, Svetlana Ovalle. She has two children, twin daughters, who are 17yo from her first  Juan F Ricci). Both twins live in the area. She is  from the first ,  in . Is in the process of trying to get a divorce from Pricila Lea, 2nd . Trauma and/or Abuse - Finding her mother dead in  was traumatic    Legal - denies    Substance Use - see history    Work History - see history    Education - see history     status - denies    . PAST SUICIDE ATTEMPTS:    no    .    INPATIENT HOSPITALIZATIONS:    no    .    DRUG REHABILITATION:    no    .    Mood Disorder Questionnaire: + 8    Question 2: Yes  Question 3: minor     . PSYCHIATRIC REVIEW OF SYSTEMS, 2020    . Mood:  negative (she rates sleeping too much, problems with concentration and being fidgety on her PHQ9. This provider did not observe fidgety and restless but her speech is very slow and her movements are slow)      (Depression: sadness, tearfulness, sleep, appetite, energy, concentration, sexual function, guilt, psychomotor agitation or slowing, interest, suicidality)    . Allie: positive for reports that there are 3-4 times during the year when she has more energy, talks more, is hypersexual, has more energy, doesn't feel like sleeping, is easily distracted. She describes these periods of time as lasting 3-4 days. (impulsivity, grandiosity, recklessness, excessive energy, decreased need for sleep, increased spending beyond means, hyperverbal, grandiose, racing thoughts, hypersexuality)    . Other: negative      (Irritability, lability, anger)    . Anxiety:  positive for feeling nervous, anxious, or on edge, not being able to stop or control worrying      (Generalized anxiety: where, when, who, how long, how frequent)    . Panic Disorder symptoms: positive for hx, hasn't had one since she was living in [facility], earlier in the summer of 2020      (Palpitations, racing heart beat, sweating, sense of impending doom, fear of recurrence, shortness of breath)    . OCD symptoms:  negative      (checking, cleaning, organizing, rituals, hang-ups, obsessive thoughts, counting, rational vs. Irrational beliefs)    . PTSD symptoms:  positive for dreams about her mother occasionally (last time was around September, 2020)      (nightmares, flashbacks, startle response, avoidance)    . Social anxiety symptoms:  negative    . Simple phobias: positive for spiders, heights      (heights, planes, spiders, etc.)    . Psychosis: positive for talking to herself sitting in the floor, acting like a kid, with her eyes open, saying that she was a  (several times), she says that sometimes she hears music and there is nothing in the house that is making music (she report the last time was a couple of weeks ago)      (hallucinations, auditory, visual, tactile, olfactory)    . Paranoia: negative    . Delusions:  negative      (TV, radio, thought broadcasting, mind control, referential thinking)      (persecutory delusion - e.g., believing one is being followed and harassed by gangs)      (grandiose delusion - e.g., believing one is a billionaire  who owns casinos around the world)      (erotomanic delusion - e.g., believing a famous  is in love with them)      (somatic delusion - e.g., believing one's sinuses have been infested by worms)       (delusions of reference - e.g., believing dialogue on a TV program is directed specifically towards the patient)      (delusions of control - e.g., believing one's thoughts and movements are controlled by planetary overlords)    . Patient's perception: negative      (Spiritual or cultural context of symptoms, reality testing)    . ADHD symptoms: negative      (able to focus and concentrate, scattered thoughts, disorganized thoughts)    . Eating Disorder symptoms:  negative      (binging, purging, excessive exercising)            TOBACCO:   reports that she has never smoked. She has never used smokeless tobacco.  ETOH:   reports no history of alcohol use. Family History:   No family history on file. Diagnosis:    Bipolar II disorder;       Diagnosis Date    Anxiety     Asthma     Depression     Diabetes mellitus ([facility] [address]     Hypertension     Seizures ([facility] [address]      no problems    Plan:  1. Continue medication management  2. CBT to target cognitive distortions  3.  Discuss therapeutic goals    Pt interventions:  Established rapport and Supportive techniques Bj Burton MSW, LCSW

## 2021-01-14 NOTE — PATIENT INSTRUCTIONS
The Best Possible Self  The Best Possible Self (BPS) exercise can be used to increase optimism. The BPS requires people to envision themselves in an imaginary future in which everything has turned out in the most optimal way. Over the past years, writing about and imagining a BPS has repeatedly been demonstrated to increase peoples mood and well-being Rubijustina Rogers, 2001; Kamila Garcia al., 2010; Axel Aviles, 2006). Phillip Menchaca et al. (2010) provided evidence that writing about and imagining a BPS can also increase optimism in terms of expecting favorable outcomes. This effect was independent of the effect on mood that was simultaneously increased by the manipulation. Goal  The BPS exercise can be used to increase optimism in terms of expecting favorable outcomes (see for instance Earl et al., 2011). Advice  While in most cases the exercise is used in a written form, it is also possible to ask clients to make drawings of their best possible self. The most powerful way to use the exercise is by instructing clients to visualize their best possible self on a daily basis. Tool Description  1. Set a timer or stopwatch for 10 minutes, during this time you are to think about your best possible future self and to write it down on paper. 2. Imagine your life the way you always imagined it would be like, your best possible self. Picture that you have performed to the best of your abilities and you had achieved the things you wanted to in life. 3. While writing, dont worry about grammar or punctuation just focus on writing all your thoughts and emotions in an expressive way. You may want to have several sheets of paper for this exercise. 4. Reflection: after completing the initial exercise, you must reflect on your feelings and answer. Think about the following questions: What effects did this exercise have?  Does this exercise affect you more emotionally or does it affect your current self-image?  Did it motivate or inspire you?  Does it make you want to make changes?  How did this exercise affect you overall?

## 2021-01-27 ENCOUNTER — OFFICE VISIT (OUTPATIENT)
Dept: PSYCHIATRY | Age: 46
End: 2021-01-27

## 2021-01-27 VITALS
SYSTOLIC BLOOD PRESSURE: 140 MMHG | BODY MASS INDEX: 49.29 KG/M2 | WEIGHT: 293 LBS | HEART RATE: 91 BPM | DIASTOLIC BLOOD PRESSURE: 78 MMHG | TEMPERATURE: 97.8 F | OXYGEN SATURATION: 96 %

## 2021-01-27 DIAGNOSIS — F29 PSYCHOSIS, UNSPECIFIED PSYCHOSIS TYPE (HCC): ICD-10-CM

## 2021-01-27 DIAGNOSIS — F31.81 MILD DEPRESSED BIPOLAR II DISORDER (HCC): Primary | ICD-10-CM

## 2021-01-27 PROCEDURE — 99214 OFFICE O/P EST MOD 30 MIN: CPT | Performed by: NURSE PRACTITIONER

## 2021-01-27 RX ORDER — RISPERIDONE 0.5 MG/1
0.5 TABLET, FILM COATED ORAL NIGHTLY
Qty: 30 TABLET | Refills: 3 | Status: SHIPPED | OUTPATIENT
Start: 2021-01-27 | End: 2021-01-27 | Stop reason: CLARIF

## 2021-01-27 ASSESSMENT — PATIENT HEALTH QUESTIONNAIRE - PHQ9
SUM OF ALL RESPONSES TO PHQ9 QUESTIONS 1 & 2: 3
4. FEELING TIRED OR HAVING LITTLE ENERGY: 1
3. TROUBLE FALLING OR STAYING ASLEEP: 1
8. MOVING OR SPEAKING SO SLOWLY THAT OTHER PEOPLE COULD HAVE NOTICED. OR THE OPPOSITE, BEING SO FIGETY OR RESTLESS THAT YOU HAVE BEEN MOVING AROUND A LOT MORE THAN USUAL: 1
SUM OF ALL RESPONSES TO PHQ QUESTIONS 1-9: 8
SUM OF ALL RESPONSES TO PHQ QUESTIONS 1-9: 8
10. IF YOU CHECKED OFF ANY PROBLEMS, HOW DIFFICULT HAVE THESE PROBLEMS MADE IT FOR YOU TO DO YOUR WORK, TAKE CARE OF THINGS AT HOME, OR GET ALONG WITH OTHER PEOPLE: 1
9. THOUGHTS THAT YOU WOULD BE BETTER OFF DEAD, OR OF HURTING YOURSELF: 0

## 2021-01-27 ASSESSMENT — ANXIETY QUESTIONNAIRES
1. FEELING NERVOUS, ANXIOUS, OR ON EDGE: 1-SEVERAL DAYS
4. TROUBLE RELAXING: 1-SEVERAL DAYS
2. NOT BEING ABLE TO STOP OR CONTROL WORRYING: 1-SEVERAL DAYS
3. WORRYING TOO MUCH ABOUT DIFFERENT THINGS: 2-OVER HALF THE DAYS
GAD7 TOTAL SCORE: 7
6. BECOMING EASILY ANNOYED OR IRRITABLE: 1-SEVERAL DAYS
5. BEING SO RESTLESS THAT IT IS HARD TO SIT STILL: 1-SEVERAL DAYS
7. FEELING AFRAID AS IF SOMETHING AWFUL MIGHT HAPPEN: 0-NOT AT ALL

## 2021-01-27 NOTE — PATIENT INSTRUCTIONS
Plan:  Continue:  Lamictal, 100mg, take 1/2 tab daily    Lamictal Cautions    Lamictal in rare occasions may cause a life threatening rash called Hensley-Te Syndrome. If you develop a rash, experience any swelling of the tongue, lips or eyes, then stop taking the medication. If the condition persists after stopping the medication, then go to an Urgent Care or to an Emergency Department to be seen and let them know that you have been taking Lamictal.     Follow up: Return in about 2 months (around 3/27/2021). If you become suicidal, follow up with this office or call the [facility] at [phone]), or dial [phone].

## 2021-01-27 NOTE — PROGRESS NOTES
1/27/2021 4:14 PM   Progress Note    IN:  [id number]  OUT: [address] [facility] 1975      Chief Complaint   Patient presents with    Follow-up    Depression    Other     psychosis, hearing music         Subjective:  Patient is a 41 y.o. female diagnosed with Bipolar Disorder and presents today for follow-up. Last seen in clinic on 12/16/20 and prior records were reviewed. Today patient states, \"I'm doing better, a little bit better. \" She reports that she hasn't had any seizures since the last visit. She reports that she continues to watch TV most of the day but in between she does get up and do laundry and dishes and other household chores. She reports that it is a little better since the last visit. She reports that she hears music about twice a week which is really not playing anywhere in the house. She says it happens sometimes at night and sometimes during the day. Patient reports side effects as follows: none. No evidence of EPS, no cogwheeling or abnormal motor movements. Absent  suicidal ideation. Reports compliance with medications as good .      Current Substance Use:  See history    BP: BP (!) 140/78   Pulse 91   Temp 97.8 °F (36.6 °C)   Wt (!) 314 lb 11.2 oz (142.7 kg)   SpO2 96%   BMI 49.29 kg/m²       Review of Systems - 14 point review:  Negative except being treated for: asthma, hyperlipidemia, T2DM, HTN, pseudoseizures, morbidly obese    Constitutional: (fevers, chills, night sweats, wt loss/gain, change in appetite, fatigue, somnolence)    HEENT: (ear pain or discharge, hearing loss, ear ringing, sinus pressure, nosebleed, nasal discharge, sore throat, oral sores, tooth pain, bleeding gums, hoarse voice, neck pain)      Cardiovascular: (HTN, chest pain, elevated cholesterol/lipids, palpitations, leg swelling, leg pain with walking)    Respiratory: (cough, wheezing, snoring, SOB with activity (dyspnea), SOB while lying flat (orthopnea), awakening with severe SOB (paroxysmal nocturnal dyspnea))    Gastrointestinal: (NVD, constipation, abdominal pain, bright red stools, black tarry stools, stool incontinence)     Genitourinary:  (pelvic pain, burning or frequency of urination, urinary urgency, blood in urine incomplete bladder emptying, urinary incontinence, STD; MEN: testicular pain or swelling, erectile dysfunction; WOMEN: LMP, heavy menstrual bleeding (menorrhagia), irregular periods, postmenopausal bleeding, menstrual pain (dymenorrhea, vaginal discharge)    Musculoskeletal: (bone pain/fracture, joint pain or swelling, musle pain)    Integumentary: (rashes, acne, non-healing sores, itching, breast lumps, breast pain, nipple discharge, hair loss)    Neurologic: (HA, muscle weakness, paresthesias (numbness, coldness, crawling or prickling), memory loss, seizure, dizziness)    Psychiatric:  (anxiety, sadness, irritability/anger, insomnia, suicidality)    Endocrine: (heat or cold intolerance, excessive thirst (polydipsia), excessive hunger (polyphagia))    Immune/Allergic: (hives, seasonal or environmental allergies, HIV exposure)    Hematologic/Lymphatic: (lymph node enlargement, easy bleeding or bruising)    History obtained via chart review and patient    PCP is No primary care provider on file. Current Meds:    Prior to Admission medications    Medication Sig Start Date End Date Taking? Authorizing Provider   HYDROcodone-acetaminophen (NORCO) 5-325 MG per tablet Take 1 tablet by mouth every 6 hours as needed. Historical Provider, MD   insulin 70-30 (HUMULIN;NOVOLIN) (70-30) 100 UNIT per ML injection vial Inject 40 Units into the skin Take 40 units in the morning and 55 units at bedtime    Historical Provider, MD   lamoTRIgine (LAMICTAL) 25 MG tablet Take 1 tablet by mouth for 2 weeks, then increase to 2 tablets by mouth daily.  12/16/20   Johanna Dopp, APRN - NP   metFORMIN (GLUCOPHAGE) 1000 MG tablet Take 1,000 mg by mouth 2 times daily (with meals)    Historical Provider, MD   amLODIPine (NORVASC) 10 MG tablet Take 10 mg by mouth daily    Historical Provider, MD   albuterol sulfate  (90 Base) MCG/ACT inhaler Inhale 2 puffs into the lungs every 6 hours as needed for Wheezing    Historical Provider, MD   Semaglutide, 1 MG/DOSE, (OZEMPIC, 1 MG/DOSE,) 2 MG/1.5ML SOPN Inject 1.5 mLs into the skin once a week    Historical Provider, MD   atorvastatin (LIPITOR) 20 MG tablet Take 20 mg by mouth daily    Historical Provider, MD   Cholecalciferol (VITAMIN D3) 1.25 MG (75973 UT) CAPS Take 1 capsule by mouth once a week    Historical Provider, MD   empagliflozin (JARDIANCE) 25 MG tablet Take 25 mg by mouth daily    Historical Provider, MD   meloxicam (MOBIC) 15 MG tablet Take 15 mg by mouth 2 times daily    Historical Provider, MD     Social History     Socioeconomic History    Marital status: Legally      Spouse name: Rebeca Richmond Number of children: 2    Years of education: 15    Highest education level: High school graduate   Occupational History    Not on file   Social Needs    Financial resource strain: Somewhat hard    Food insecurity     Worry: Sometimes true     Inability: Never true    Transportation needs     Medical: Yes     Non-medical: No   Tobacco Use    Smoking status: Never Smoker    Smokeless tobacco: Never Used   Substance and Sexual Activity    Alcohol use: Never     Frequency: Never    Drug use: Never    Sexual activity: Not Currently     Comment: no on birth control   Lifestyle    Physical activity     Days per week: 3 days     Minutes per session: 60 min    Stress: Very much   Relationships    Social connections     Talks on phone: More than three times a week     Gets together:  Three times a week     Attends Taoism service: More than 4 times per year     Active member of club or organization: Yes     Attends meetings of clubs or organizations: More than 4 times per year     Relationship status:     Intimate partner violence     Fear hallucinations, negative obsessions,  negativehomicidal and negative suicidal   Thought Process: linear, goal directed, coherent and slow  Associations: logical connections  Attention Span and concentration: Normal   Judgement Insight:  normal and appropriate  Gait and Station:normal gait and station   Sleep: 10-12 hrs (reports she is going to bed around 11:30p and getting up around 10a)  Appetite: ok      No results found for: NA, K, CL, CO2, BUN, CREATININE, GLUCOSE, CALCIUM, PROT, LABALBU, BILITOT, ALKPHOS, AST, ALT, LABGLOM, GFRAA, AGRATIO, GLOB  No results found for: NA, K, CL, CO2, BUN, CREATININE, GLUCOSE, CALCIUM   No results found for: CHOL  No results found for: TRIG  No results found for: HDL  No results found for: LDLCHOLESTEROL, LDLCALC  No results found for: LABVLDL, VLDL  No results found for: CHOLHDLRATIO  No results found for: LABA1C  No results found for: EAG  No results found for: TSHFT4, TSH  No results found for: VITD25    Assessments Administered:    PHQ9: 8, mild  GAD7: 7, mild    Cognition: Can spell \"world\" backwards: Yes                    Can do serial 7's: Yes    Assessment:   1. Mild depressed bipolar II disorder ([facility] [address]    2. Psychosis, unspecified psychosis type ([facility] [address]        Plan:  Continue:  Lamictal, 100mg, take 1/2 tab daily    Lamictal Cautions    Lamictal in rare occasions may cause a life threatening rash called Hensley-Te Syndrome. If you develop a rash, experience any swelling of the tongue, lips or eyes, then stop taking the medication. If the condition persists after stopping the medication, then go to an Urgent Care or to an Emergency Department to be seen and let them know that you have been taking Lamictal.     Follow up: Return in about 2 months (around 3/27/2021). If you become suicidal, follow up with this office or call the [facility] at [phone]), or dial [phone].    1.  The risks, benefits, side effects, indications, contraindications, and adverse effects of the medications have been discussed. Yes.  2. The pt has verbalized understanding and has capacity to give informed consent. 3. The Slava Amin report has been reviewed according to [facility] regulations. 4. Supportive therapy offered. 5. Follow up: Return in about 2 months (around 3/27/2021). 6. The patient has been advised to call with any problems. 7. Controlled substance Treatment Plan: none. 8. The above listed medications have been continued, modifications in meds and other orders/labs as follows:      Orders Placed This Encounter   Medications    DISCONTD: risperiDONE (RISPERDAL) 0.5 MG tablet     Sig: Take 1 tablet by mouth nightly     Dispense:  30 tablet     Refill:  3      No orders of the defined types were placed in this encounter. 9. Additional comments: She reports that twice last week she heard music and couldn't figure out where it was coming from. She reports that she has lost weight since the last visit. She is trying to get down below 300 lb. Today she is 314 lb. Would like to start Risperdal to address the music that she hears occasionally. She reports twice in the last couple of weeks. She has lost access to a diabetic medication, Ozempic, due to insurance, and her blood glucose has been elevated to 200-300. Will consider starting Risperdal once her blood glucose is better controlled. She and her doctor are working to find a solution for [address]. She reports that hearing the music at this time is tolerable. She reports that she has been going to bed around 11:30p and is getting up around 10am. She has the flat affect, slow speech, that is characteristic of schizophrenia. Will continue to monitor for this. Will follow up in about 2 months. 10.Over 50% of the total visit time of  30  minutes was spent on counseling and/or coordination of care of:                        1. Mild depressed bipolar II disorder ([facility] [address]    2.  Psychosis, unspecified psychosis type [facility])                      Psychotherapy Topics: mood/medication effectiveness       Gabbi Johnson PMHNP-BC

## 2021-03-30 ENCOUNTER — TELEPHONE (OUTPATIENT)
Dept: PSYCHIATRY | Age: 46
End: 2021-03-30

## 2021-03-30 NOTE — TELEPHONE ENCOUNTER
Called pt to remind them about their appt tomorrow 3-31.     Electronically signed by Milton Cota MA on 3/30/2021 at 9:08 AM

## 2021-04-12 ENCOUNTER — TELEPHONE (OUTPATIENT)
Dept: PSYCHIATRY | Age: 46
End: 2021-04-12

## 2021-04-12 NOTE — TELEPHONE ENCOUNTER
Pt called to cancel/reschedule for her 4/14 appt because she has another appointment that day. Rescheduled her for 4/21.     Electronically signed by Christina Mcguire MA on 4/12/2021 at 11:10 AM

## 2021-04-21 ENCOUNTER — OFFICE VISIT (OUTPATIENT)
Dept: PSYCHIATRY | Age: 46
End: 2021-04-21
Payer: COMMERCIAL

## 2021-04-21 VITALS
SYSTOLIC BLOOD PRESSURE: 175 MMHG | OXYGEN SATURATION: 97 % | HEIGHT: 67 IN | DIASTOLIC BLOOD PRESSURE: 92 MMHG | TEMPERATURE: 98.9 F | WEIGHT: 293 LBS | BODY MASS INDEX: 45.99 KG/M2 | HEART RATE: 81 BPM

## 2021-04-21 DIAGNOSIS — F20.1 DISORGANIZED SCHIZOPHRENIA (HCC): Primary | ICD-10-CM

## 2021-04-21 DIAGNOSIS — R41.89 IMPAIRED COGNITION: ICD-10-CM

## 2021-04-21 DIAGNOSIS — G47.30 SLEEP APNEA, UNSPECIFIED TYPE: ICD-10-CM

## 2021-04-21 PROCEDURE — 99214 OFFICE O/P EST MOD 30 MIN: CPT | Performed by: NURSE PRACTITIONER

## 2021-04-21 RX ORDER — LAMOTRIGINE 25 MG/1
50 TABLET ORAL DAILY
Qty: 60 TABLET | Refills: 3 | Status: SHIPPED | OUTPATIENT
Start: 2021-04-21 | End: 2021-06-25 | Stop reason: ALTCHOICE

## 2021-04-21 ASSESSMENT — SLEEP AND FATIGUE QUESTIONNAIRES
HOW LIKELY ARE YOU TO NOD OFF OR FALL ASLEEP WHILE SITTING QUIETLY AFTER LUNCH WITHOUT ALCOHOL: 2
ESS TOTAL SCORE: 13
HOW LIKELY ARE YOU TO NOD OFF OR FALL ASLEEP WHILE SITTING AND TALKING TO SOMEONE: 0
HOW LIKELY ARE YOU TO NOD OFF OR FALL ASLEEP WHILE WATCHING TV: 2
HOW LIKELY ARE YOU TO NOD OFF OR FALL ASLEEP WHILE SITTING INACTIVE IN A PUBLIC PLACE: 1

## 2021-04-21 ASSESSMENT — ANXIETY QUESTIONNAIRES
5. BEING SO RESTLESS THAT IT IS HARD TO SIT STILL: 3-NEARLY EVERY DAY
2. NOT BEING ABLE TO STOP OR CONTROL WORRYING: 1-SEVERAL DAYS
GAD7 TOTAL SCORE: 9
6. BECOMING EASILY ANNOYED OR IRRITABLE: 1-SEVERAL DAYS

## 2021-04-21 ASSESSMENT — PATIENT HEALTH QUESTIONNAIRE - PHQ9
7. TROUBLE CONCENTRATING ON THINGS, SUCH AS READING THE NEWSPAPER OR WATCHING TELEVISION: 3
SUM OF ALL RESPONSES TO PHQ QUESTIONS 1-9: 14
SUM OF ALL RESPONSES TO PHQ QUESTIONS 1-9: 14
1. LITTLE INTEREST OR PLEASURE IN DOING THINGS: 1
5. POOR APPETITE OR OVEREATING: 0
9. THOUGHTS THAT YOU WOULD BE BETTER OFF DEAD, OR OF HURTING YOURSELF: 0

## 2021-04-21 NOTE — PROGRESS NOTES
4/21/2021 6:44 PM   Progress Note    IN:  1279  OUT: [facility] 1975      Chief Complaint   Patient presents with    Follow-up    Anxiety    Depression    Other     avolition, AH         Subjective:  Patient is a 40 y.o. female diagnosed with Bipolar Disorder and presents today for follow-up. Last seen in clinic on 1/27/21 and prior records were reviewed. Today patient states, \"I'm doing alright. \" She reports 2 seizures in February, 3 seizures in March and 1 seizure in April. She says that she is not hearing music much anymore, maybe once or twice a month. She says it happens during the day most of the time. She reports that sometimes she doesn't feel like getting up to do things. She reports that she forgets things a lot. She reports that her fiance prepares a list of things that she is supposed to do and that this helps. She reports being forgetful and being in the middle of something and getting distracted and off task on things that she needs to do. She has a new grandson who was born yesterday (her daughter). Patient reports side effects as follows: none. No evidence of EPS, no cogwheeling or abnormal motor movements. Absent  suicidal ideation. Reports compliance with medications as good .      Current Substance Use:  See history    BP: BP (!) 175/92   Pulse 81   Temp 98.9 °F (37.2 °C)   Ht 5' 7\" (1.702 m)   Wt (!) 317 lb 4.8 oz (143.9 kg)   SpO2 97%   BMI 49.70 kg/m²       Review of Systems - 14 point review:  Negative except being treated for: asthma, hyperlipidemia, T2DM, HTN, pseudoseizures, morbidly obese    Constitutional: (fevers, chills, night sweats, wt loss/gain, change in appetite, fatigue, somnolence)    HEENT: (ear pain or discharge, hearing loss, ear ringing, sinus pressure, nosebleed, nasal discharge, sore throat, oral sores, tooth pain, bleeding gums, hoarse voice, neck pain)      Cardiovascular: (HTN, chest pain, elevated cholesterol/lipids, palpitations, leg swelling, leg pain with walking)    Respiratory: (cough, wheezing, snoring, SOB with activity (dyspnea), SOB while lying flat (orthopnea), awakening with severe SOB (paroxysmal nocturnal dyspnea))    Gastrointestinal: (NVD, constipation, abdominal pain, bright red stools, black tarry stools, stool incontinence)     Genitourinary:  (pelvic pain, burning or frequency of urination, urinary urgency, blood in urine incomplete bladder emptying, urinary incontinence, STD; MEN: testicular pain or swelling, erectile dysfunction; WOMEN: LMP, heavy menstrual bleeding (menorrhagia), irregular periods, postmenopausal bleeding, menstrual pain (dymenorrhea, vaginal discharge)    Musculoskeletal: (bone pain/fracture, joint pain or swelling, musle pain)    Integumentary: (rashes, acne, non-healing sores, itching, breast lumps, breast pain, nipple discharge, hair loss)    Neurologic: (HA, muscle weakness, paresthesias (numbness, coldness, crawling or prickling), memory loss, seizure, dizziness)    Psychiatric:  (anxiety, sadness, irritability/anger, insomnia, suicidality)    Endocrine: (heat or cold intolerance, excessive thirst (polydipsia), excessive hunger (polyphagia))    Immune/Allergic: (hives, seasonal or environmental allergies, HIV exposure)    Hematologic/Lymphatic: (lymph node enlargement, easy bleeding or bruising)    History obtained via chart review and patient    PCP is No primary care provider on file. Current Meds:    Prior to Admission medications    Medication Sig Start Date End Date Taking? Authorizing Provider   lamoTRIgine (LAMICTAL) 25 MG tablet Take 2 tablets by mouth daily . 4/21/21  Yes JAS Anaya NP   HYDROcodone-acetaminophen (NORCO) 5-325 MG per tablet Take 1 tablet by mouth every 6 hours as needed.     Historical Provider, MD   insulin 70-30 (HUMULIN;NOVOLIN) (70-30) 100 UNIT per ML injection vial Inject 40 Units into the skin Take 40 units in the morning and 55 units at bedtime    Historical Provider, MD   metFORMIN (GLUCOPHAGE) 1000 MG tablet Take 1,000 mg by mouth 2 times daily (with meals)    Historical Provider, MD   amLODIPine (NORVASC) 10 MG tablet Take 10 mg by mouth daily    Historical Provider, MD   albuterol sulfate  (90 Base) MCG/ACT inhaler Inhale 2 puffs into the lungs every 6 hours as needed for Wheezing    Historical Provider, MD   Semaglutide, 1 MG/DOSE, (OZEMPIC, 1 MG/DOSE,) 2 MG/1.5ML SOPN Inject 1.5 mLs into the skin once a week    Historical Provider, MD   atorvastatin (LIPITOR) 20 MG tablet Take 20 mg by mouth daily    Historical Provider, MD   Cholecalciferol (VITAMIN D3) 1.25 MG (19857 UT) CAPS Take 1 capsule by mouth once a week    Historical Provider, MD   empagliflozin (JARDIANCE) 25 MG tablet Take 25 mg by mouth daily    Historical Provider, MD   meloxicam (MOBIC) 15 MG tablet Take 15 mg by mouth 2 times daily    Historical Provider, MD     Social History     Socioeconomic History    Marital status: Legally      Spouse name: Herminia Banuelos Number of children: 2    Years of education: 15    Highest education level: High school graduate   Occupational History    None   Social Needs    Financial resource strain: Somewhat hard    Food insecurity     Worry: Sometimes true     Inability: Never true    Transportation needs     Medical: Yes     Non-medical: No   Tobacco Use    Smoking status: Never Smoker    Smokeless tobacco: Never Used   Substance and Sexual Activity    Alcohol use: Never     Frequency: Never    Drug use: Never    Sexual activity: Not Currently     Comment: no on birth control   Lifestyle    Physical activity     Days per week: 3 days     Minutes per session: 60 min    Stress: Very much   Relationships    Social connections     Talks on phone: More than three times a week     Gets together:  Three times a week     Attends Je[employer]'s witness service: More than 4 times per year     Active member of club or organization: Yes Education - see history     status - denies    . PAST SUICIDE ATTEMPTS:    no    .    INPATIENT HOSPITALIZATIONS:    no    .    DRUG REHABILITATION:    no    .    Mood Disorder Questionnaire: + 8    Question 2: Yes  Question 3: minor    4/21/21: [place] Sleepiness Scale: 12    4/21/21: SLUMS, 19/30     . PSYCHIATRIC REVIEW OF SYSTEMS, 12/16/2020    . Mood:  negative (she rates sleeping too much, problems with concentration and being fidgety on her PHQ9. This provider did not observe fidgety and restless but her speech is very slow and her movements are slow)      (Depression: sadness, tearfulness, sleep, appetite, energy, concentration, sexual function, guilt, psychomotor agitation or slowing, interest, suicidality)    . Allie: positive for reports that there are 3-4 times during the year when she has more energy, talks more, is hypersexual, has more energy, doesn't feel like sleeping, is easily distracted. She describes these periods of time as lasting 3-4 days. (impulsivity, grandiosity, recklessness, excessive energy, decreased need for sleep, increased spending beyond means, hyperverbal, grandiose, racing thoughts, hypersexuality)    . Other: negative      (Irritability, lability, anger)    . Anxiety:  positive for feeling nervous, anxious, or on edge, not being able to stop or control worrying      (Generalized anxiety: where, when, who, how long, how frequent)    . Panic Disorder symptoms: positive for hx, hasn't had one since she was living in [facility], earlier in the summer of 2020      (Palpitations, racing heart beat, sweating, sense of impending doom, fear of recurrence, shortness of breath)    . OCD symptoms:  negative      (checking, cleaning, organizing, rituals, hang-ups, obsessive thoughts, counting, rational vs. Irrational beliefs)    .     PTSD symptoms:  positive for dreams about her mother occasionally (last time was around September, 2020) (nightmares, flashbacks, startle response, avoidance)    . Social anxiety symptoms:  negative    . Simple phobias: positive for spiders, heights      (heights, planes, spiders, etc.)    . Psychosis: positive for talking to herself sitting in the floor, acting like a kid, with her eyes open, saying that she was a  (several times), she says that sometimes she hears music and there is nothing in the house that is making music (she report the last time was a couple of weeks ago)      (hallucinations, auditory, visual, tactile, olfactory)    . Paranoia: negative    . Delusions:  negative      (TV, radio, thought broadcasting, mind control, referential thinking)      (persecutory delusion - e.g., believing one is being followed and harassed by gangs)      (grandiose delusion - e.g., believing one is a billionaire  who owns casinos around the world)      (erotomanic delusion - e.g., believing a famous  is in love with them)      (somatic delusion - e.g., believing one's sinuses have been infested by worms)       (delusions of reference - e.g., believing dialogue on a TV program is directed specifically towards the patient)      (delusions of control - e.g., believing one's thoughts and movements are controlled by planetary overlords)    . Patient's perception: negative      (Spiritual or cultural context of symptoms, reality testing)    . ADHD symptoms: negative      (able to focus and concentrate, scattered thoughts, disorganized thoughts)    . Eating Disorder symptoms:  negative      (binging, purging, excessive exercising)            MSE:  Patient is  A & O x 4. Appearance:  street clothes, fair grooming and fair hygiene, appropriately dressed for season and age. Cognition:  Recent memory intact , remote memory intact , poor fund of knowledge, average  intelligence level. Speech:  hesitant and slow  Language: Naming: intact;  Word Finding: intact  Conversation no evidence of delusions  Behavior:  Cooperative and Good eye contact  Mood: \"happy\"  Affect: incongruent with mood (has a flat affect)  Thought Content: negative delusions, positive for hearing music \"every once in a while\" hallucinations, negative obsessions,  negativehomicidal and negative suicidal   Thought Process: linear, goal directed, coherent and slow, disorganized  Associations: logical connections  Attention Span and concentration: Normal   Judgement Insight:  normal and appropriate  Gait and Station:normal gait and station   Sleep: avg 10-12 hrs (reports she is going to bed around 11:30p and getting up around 10a, napping a lot during the day, often feels like doing nothing but sleeping during the day)  Appetite: ok      No results found for: NA, K, CL, CO2, BUN, CREATININE, GLUCOSE, CALCIUM, PROT, LABALBU, BILITOT, ALKPHOS, AST, ALT, LABGLOM, GFRAA, AGRATIO, GLOB  No results found for: NA, K, CL, CO2, BUN, CREATININE, GLUCOSE, CALCIUM   No results found for: CHOL  No results found for: TRIG  No results found for: HDL  No results found for: LDLCHOLESTEROL, LDLCALC  No results found for: LABVLDL, VLDL  No results found for: CHOLHDLRATIO  No results found for: LABA1C  No results found for: EAG  No results found for: TSHFT4, TSH  No results found for: VITD25    Assessments Administered:    PHQ9: 14, moderate  GAD7:    9, mild    [place] Sleepiness Scale: 12  SLUMS: 19/30    Cognition: Can spell \"world\" backwards: Yes                    Can do serial 7's: Yes    Assessment:   1. Disorganized schizophrenia ([facility] [address]    2. Impaired cognition    3. Sleep apnea, unspecified type         R/O Conversion Disorder    Plan:  Continue:  Lamictal, 25mg, take 2 tabs daily    Lamictal Cautions    Lamictal in rare occasions may cause a life threatening rash called Hensley-Te Syndrome. If you develop a rash, experience any swelling of the tongue, lips or eyes, then stop taking the medication.  If the condition persists after stopping the medication, then go to an Urgent Care or to an Emergency Department to be seen and let them know that you have been taking Lamictal.     Follow up: Return in about 3 weeks (around 5/12/2021). If you become suicidal, follow up with this office or call the [facility] at [phone]), or dial 911.    1. The risks, benefits, side effects, indications, contraindications, and adverse effects of the medications have been discussed. Yes.  2. The pt has verbalized understanding and has capacity to give informed consent. 3. The Marlin Padgett report has been reviewed according to [facility] regulations. 4. Supportive therapy offered. 5. Follow up: Return in about 3 weeks (around 5/12/2021). 6. The patient has been advised to call with any problems. 7. Controlled substance Treatment Plan: none. 8. The above listed medications have been continued, modifications in meds and other orders/labs as follows:      Orders Placed This Encounter   Medications    lamoTRIgine (LAMICTAL) 25 MG tablet     Sig: Take 2 tablets by mouth daily . Dispense:  60 tablet     Refill:  3        Orders Placed This Encounter   Procedures   [facility], [place]     Referral Priority:   Routine     Referral Type:   Eval and Treat     Referral Reason:   Specialty Services Required     Requested Specialty:   Neurology     Number of Visits Requested:   1       9. Additional comments: She graduated from high school and reports that she mostly made C's. She says that she did better when she was in middle school. She reports 2 seizures in February, 3 seizures in March and 1 seizure in April. She says that she is not hearing music much anymore, maybe once or twice a month. She says that hearing music happens during the day most of the time. She reports that sometimes she doesn't feel like getting up to do things. She reports that she forgets things a lot.  She reports that her fiance prepares a list of things that she is supposed to do and that this helps. She reports being forgetful and being in the middle of something and getting distracted and off task on things that she needs to do. She is cognitively impaired AEB 19/30 on the SLUMS. She reports that prior to having seizures she didn't have memory problems. She records her seizures on her phone. During the months of November, December and January she didn't have any, then they started back up in February. She reports that she doesn't know of any trigger which has brought them back on. Her [place] sleepiness score was 12. She is obese and has a large neck circumference and reports napping a lot. She may have sleep apnea. Will make a referral to the sleep clinic to evaluate for possible sleep apnea. This patient is very hard to interview as her thoughts move very slowly and it takes a long time to get information. Her scores on the PHQ9 and GAD7 are questionable as she talked about sleep being a problem, then not scoring a high score on sleep issues. She is very unreliable as a historian and it would be helpful to have a family member or friend to provide collateral information. She has the flat affect characteristic of schizophrenia. She reports hearing music once or twice a month. Her thoughts are also very disorganized as it takes her a long time to process what is being said to her. No EEG has been run here. 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 It is not known if he had collateral information from [address] [place] [address] where she lived prior to coming to [place]. Since most of her symptomology is the negative symptoms of schizophrenia, after studying her case, would like to try Vraylar to see if it can help with the negative symptoms. The only positive symptoms she has at present are the voices which she says she hears only 1-2 times per month. Will see if the  can get her scheduled within 3 weeks to 1 month.       10.Over 50% of the total visit time of  30 minutes was spent on counseling and/or coordination of care of:                        1. Disorganized schizophrenia ([facility] [address]    2. Impaired cognition    3.  Sleep apnea, unspecified type                      Psychotherapy Topics: mood/medication effectiveness       Geovany Olivarez PMHNP-BC

## 2021-04-21 NOTE — PATIENT INSTRUCTIONS
Plan:  Continue:  Lamictal, 25mg, take 2 tabs daily    Lamictal Cautions    Lamictal in rare occasions may cause a life threatening rash called Hensley-Te Syndrome. If you develop a rash, experience any swelling of the tongue, lips or eyes, then stop taking the medication. If the condition persists after stopping the medication, then go to an Urgent Care or to an Emergency Department to be seen and let them know that you have been taking Lamictal.     Follow up: Return in about 3 weeks (around 5/12/2021). If you become suicidal, follow up with this office or call the [facility] at [phone]), or dial [phone].

## 2021-04-22 ENCOUNTER — TELEPHONE (OUTPATIENT)
Dept: NEUROLOGY | Age: 46
End: 2021-04-22

## 2021-04-22 ENCOUNTER — TELEPHONE (OUTPATIENT)
Dept: PSYCHIATRY | Age: 46
End: 2021-04-22

## 2021-04-22 NOTE — TELEPHONE ENCOUNTER
Called spoke with patient about an appointment with Sudha Bui, patient is aware of the appointment nb

## 2021-04-29 ENCOUNTER — OFFICE VISIT (OUTPATIENT)
Dept: PSYCHIATRY | Age: 46
End: 2021-04-29
Payer: COMMERCIAL

## 2021-04-29 DIAGNOSIS — F31.81 BIPOLAR 2 DISORDER (HCC): Primary | ICD-10-CM

## 2021-04-29 PROCEDURE — 90837 PSYTX W PT 60 MINUTES: CPT | Performed by: SOCIAL WORKER

## 2021-04-29 NOTE — PROGRESS NOTES
appointments and verbalized she would call sooner if needed. Pt denies Suicidal Ideations, Homicidal Ideation, Auditory Hallucinations, Visual Hallucinations, Tactical Hallucinations. MSE:    Appearance    alert, cooperative, no distress  Appetite normal  Sleep disturbance Yes  Fatigue Yes  Loss of pleasure No  Impulsive behavior No  Speech    whispered  Mood    Neutral   Affect    normal affect  Thought Content    intact  Thought Process    slow  Associations    logical connections  Insight    Fair  Judgment    Intact  Orientation    oriented to person, place, time, and general circumstances  Memory    recent and remote memory intact  Attention/Concentration    intact  Morbid ideation No  Suicide Assessment    no suicidal ideation      History:  Social History     Socioeconomic History    Marital status: Legally      Spouse name: Estuardo    Number of children: 2    Years of education: 15    Highest education level: High school graduate   Occupational History    Not on file   Social Needs    Financial resource strain: Somewhat hard    Food insecurity     Worry: Sometimes true     Inability: Never true    Transportation needs     Medical: Yes     Non-medical: No   Tobacco Use    Smoking status: Never Smoker    Smokeless tobacco: Never Used   Substance and Sexual Activity    Alcohol use: Never     Frequency: Never    Drug use: Never    Sexual activity: Not Currently     Comment: no on birth control   Lifestyle    Physical activity     Days per week: 3 days     Minutes per session: 60 min    Stress: Very much   Relationships    Social connections     Talks on phone: More than three times a week     Gets together:  Three times a week     Attends Judaism service: More than 4 times per year     Active member of club or organization: Yes     Attends meetings of clubs or organizations: More than 4 times per year     Relationship status:     Intimate partner violence     Fear of current or ex partner: No     Emotionally abused: No     Physically abused: No     Forced sexual activity: No   Other Topics Concern    Not on file   Social History Narrative    PRIOR MEDICATION TRIALS    Duloxetine (hasn't taken since )    Lithium (long time ago)    Lamictal (current)        . SLEEP STUDY: no    .    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 negative history of head trauma. Ki Kumar PREVIOUS PSYCHIATRIC HISTORY    She saw a mental health provider in [place] [address]. She was seeing the provider for depression and anxiety. She says that she was in her 19's and 30's when she was seeing this provider. She stopped seeing the provider because she didn't have insurance to pay for the treatment. She says that she hasn't seen a mental health provider since then except for [facility] who she saw in . She is not currently on any psychiatric medications. Ki Kumar FAMILY PSYCHIATRIC HISTORY    Paternal grandmother, anxiety, the family found her in her closet one time talking to someone that wasn't there    . SOCIAL HISTORY    Born and Raised - [place] [facility] [place] [address], raised in [place] [address]. Moved to [place] in . She moved to [place] because her daughter was living in [place]. Patient is , her  , Poli Nair, is in half-way, they  in . They are . She has a fiance, Amy Ashley. She has two children, twin daughters, who are 17yo from her first  Jose Lennon). Both twins live in the area. She is  from the first ,  in . Is in the process of trying to get a divorce from Poli Nair, 2nd . Trauma and/or Abuse - Finding her mother dead in  was traumatic    Legal - denies    Substance Use - see history    Work History - see history    Education - see history     status - denies    .     PAST SUICIDE ATTEMPTS:    no    .    INPATIENT HOSPITALIZATIONS:    no    .    DRUG (heights, planes, spiders, etc.)    . Psychosis: positive for talking to herself sitting in the floor, acting like a kid, with her eyes open, saying that she was a  (several times), she says that sometimes she hears music and there is nothing in the house that is making music (she report the last time was a couple of weeks ago)      (hallucinations, auditory, visual, tactile, olfactory)    . Paranoia: negative    . Delusions:  negative      (TV, radio, thought broadcasting, mind control, referential thinking)      (persecutory delusion - e.g., believing one is being followed and harassed by gangs)      (grandiose delusion - e.g., believing one is a billionaire  who owns casinos around the world)      (erotomanic delusion - e.g., believing a famous  is in love with them)      (somatic delusion - e.g., believing one's sinuses have been infested by worms)       (delusions of reference - e.g., believing dialogue on a TV program is directed specifically towards the patient)      (delusions of control - e.g., believing one's thoughts and movements are controlled by planetary overlords)    . Patient's perception: negative      (Spiritual or cultural context of symptoms, reality testing)    . ADHD symptoms: negative      (able to focus and concentrate, scattered thoughts, disorganized thoughts)    . Eating Disorder symptoms:  negative      (binging, purging, excessive exercising)            Medications:   Current Outpatient Medications   Medication Sig Dispense Refill    lamoTRIgine (LAMICTAL) 25 MG tablet Take 2 tablets by mouth daily . 60 tablet 3    HYDROcodone-acetaminophen (NORCO) 5-325 MG per tablet Take 1 tablet by mouth every 6 hours as needed.       insulin 70-30 (HUMULIN;NOVOLIN) (70-30) 100 UNIT per ML injection vial Inject 40 Units into the skin Take 40 units in the morning and 55 units at bedtime      metFORMIN (GLUCOPHAGE) 1000 MG tablet Take 1,000 mg by mouth 2 No     Physically abused: No     Forced sexual activity: No   Other Topics Concern    Not on file   Social History Narrative    PRIOR MEDICATION TRIALS    Duloxetine (hasn't taken since )    Lithium (long time ago)    Lamictal (current)        . SLEEP STUDY: no    .    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 negative history of head trauma. Anabel Tabithatammy PREVIOUS PSYCHIATRIC HISTORY    She saw a mental health provider in . [place] [address]. She was seeing the provider for depression and anxiety. She says that she was in her 19's and 30's when she was seeing this provider. She stopped seeing the provider because she didn't have insurance to pay for the treatment. She says that she hasn't seen a mental health provider since then except for [facility] [address] who she saw in . She is not currently on any psychiatric medications. Anabel Salinas FAMILY PSYCHIATRIC HISTORY    Paternal grandmother, anxiety, the family found her in her closet one time talking to someone that wasn't there    . SOCIAL HISTORY    Born and Raised - [place] [facility] [place]. [place] [address], raised in . [facility] [address] [place] [address]. [facility] [address] [facility] . She moved to [address] [place] [address] S because her [place] [facility] [place] [facility]. Patient is , her  , Erwin Kidd, is in penitentiary, they  in . They are . She has a fiance, Jerrell Torres. She has two children, twin daughters, who are 19yo from her first  Marium Frost. Both twins live in the area. She is  from the first ,  in . Is in the process of trying to get a divorce from Erwin Kidd, 2nd . Trauma and/or Abuse - Finding her mother dead in  was traumatic    Legal - denies    Substance Use - see history    Work History - see history    Education - see history     status - denies    .     PAST SUICIDE ATTEMPTS:    no    .    INPATIENT HOSPITALIZATIONS:    no    .    DRUG REHABILITATION:    no    .    Mood Disorder Questionnaire: + 8    Question 2: Yes  Question 3: minor    4/21/21: Castle Sleepiness Scale: 12    4/21/21: SLUMS, 19/30     . PSYCHIATRIC REVIEW OF SYSTEMS, 12/16/2020    . Mood:  negative (she rates sleeping too much, problems with concentration and being fidgety on her PHQ9. This provider did not observe fidgety and restless but her speech is very slow and her movements are slow)      (Depression: sadness, tearfulness, sleep, appetite, energy, concentration, sexual function, guilt, psychomotor agitation or slowing, interest, suicidality)    . Allie: positive for reports that there are 3-4 times during the year when she has more energy, talks more, is hypersexual, has more energy, doesn't feel like sleeping, is easily distracted. She describes these periods of time as lasting 3-4 days. (impulsivity, grandiosity, recklessness, excessive energy, decreased need for sleep, increased spending beyond means, hyperverbal, grandiose, racing thoughts, hypersexuality)    . Other: negative      (Irritability, lability, anger)    . Anxiety:  positive for feeling nervous, anxious, or on edge, not being able to stop or control worrying      (Generalized anxiety: where, when, who, how long, how frequent)    . Panic Disorder symptoms: positive for hx, hasn't had one since she was living in [facility], earlier in the summer of 2020      (Palpitations, racing heart beat, sweating, sense of impending doom, fear of recurrence, shortness of breath)    . OCD symptoms:  negative      (checking, cleaning, organizing, rituals, hang-ups, obsessive thoughts, counting, rational vs. Irrational beliefs)    . PTSD symptoms:  positive for dreams about her mother occasionally (last time was around September, 2020)      (nightmares, flashbacks, startle response, avoidance)    . Social anxiety symptoms:  negative    . Simple phobias: positive for spiders, heights      (heights, planes, spiders, etc.)    . Psychosis: positive for talking to herself sitting in the floor, acting like a kid, with her eyes open, saying that she was a  (several times), she says that sometimes she hears music and there is nothing in the house that is making music (she report the last time was a couple of weeks ago)      (hallucinations, auditory, visual, tactile, olfactory)    . Paranoia: negative    . Delusions:  negative      (TV, radio, thought broadcasting, mind control, referential thinking)      (persecutory delusion - e.g., believing one is being followed and harassed by gangs)      (grandiose delusion - e.g., believing one is a billionaire  who owns casinos around the world)      (erotomanic delusion - e.g., believing a famous  is in love with them)      (somatic delusion - e.g., believing one's sinuses have been infested by worms)       (delusions of reference - e.g., believing dialogue on a TV program is directed specifically towards the patient)      (delusions of control - e.g., believing one's thoughts and movements are controlled by planetary overlords)    . Patient's perception: negative      (Spiritual or cultural context of symptoms, reality testing)    . ADHD symptoms: negative      (able to focus and concentrate, scattered thoughts, disorganized thoughts)    . Eating Disorder symptoms:  negative      (binging, purging, excessive exercising)            TOBACCO:   reports that she has never smoked. She has never used smokeless tobacco.  ETOH:   reports no history of alcohol use. Family History:   No family history on file. Diagnosis:    The encounter diagnosis was Bipolar 2 disorder ([facility] Diagnosis Date    Anxiety     Asthma     Depression     Diabetes mellitus ([facility]     Hypertension     Seizures ([facility]      Problems with primary support group and Housing problems    Plan:  1. Continue medication management  2. CBT to target cognitive distortions  3.  Discuss therapeutic goals    Pt interventions:  Provided education, Discussed self-care (sleep, nutrition, rewarding activities, social support, exercise), Established rapport, Supportive techniques and Emphasized self-care as important for managing overall health      Miguel A Mosqueda MSW, LCSW

## 2021-06-02 ENCOUNTER — OFFICE VISIT (OUTPATIENT)
Dept: PSYCHIATRY | Age: 46
End: 2021-06-02
Payer: COMMERCIAL

## 2021-06-02 VITALS
OXYGEN SATURATION: 94 % | SYSTOLIC BLOOD PRESSURE: 156 MMHG | HEART RATE: 84 BPM | WEIGHT: 293 LBS | DIASTOLIC BLOOD PRESSURE: 87 MMHG | BODY MASS INDEX: 48.44 KG/M2 | TEMPERATURE: 98.6 F

## 2021-06-02 DIAGNOSIS — F39 MOOD DISORDER (HCC): Primary | ICD-10-CM

## 2021-06-02 PROCEDURE — 99214 OFFICE O/P EST MOD 30 MIN: CPT | Performed by: NURSE PRACTITIONER

## 2021-06-02 ASSESSMENT — ANXIETY QUESTIONNAIRES
GAD7 TOTAL SCORE: 10
1. FEELING NERVOUS, ANXIOUS, OR ON EDGE: 1-SEVERAL DAYS
5. BEING SO RESTLESS THAT IT IS HARD TO SIT STILL: 3-NEARLY EVERY DAY
3. WORRYING TOO MUCH ABOUT DIFFERENT THINGS: 1-SEVERAL DAYS
4. TROUBLE RELAXING: 2-OVER HALF THE DAYS
6. BECOMING EASILY ANNOYED OR IRRITABLE: 2-OVER HALF THE DAYS
7. FEELING AFRAID AS IF SOMETHING AWFUL MIGHT HAPPEN: 0-NOT AT ALL
2. NOT BEING ABLE TO STOP OR CONTROL WORRYING: 1-SEVERAL DAYS

## 2021-06-02 ASSESSMENT — PATIENT HEALTH QUESTIONNAIRE - PHQ9
SUM OF ALL RESPONSES TO PHQ QUESTIONS 1-9: 11
4. FEELING TIRED OR HAVING LITTLE ENERGY: 1
SUM OF ALL RESPONSES TO PHQ9 QUESTIONS 1 & 2: 2
2. FEELING DOWN, DEPRESSED OR HOPELESS: 1
3. TROUBLE FALLING OR STAYING ASLEEP: 2
5. POOR APPETITE OR OVEREATING: 0
1. LITTLE INTEREST OR PLEASURE IN DOING THINGS: 1
7. TROUBLE CONCENTRATING ON THINGS, SUCH AS READING THE NEWSPAPER OR WATCHING TELEVISION: 3
SUM OF ALL RESPONSES TO PHQ QUESTIONS 1-9: 11
9. THOUGHTS THAT YOU WOULD BE BETTER OFF DEAD, OR OF HURTING YOURSELF: 0
8. MOVING OR SPEAKING SO SLOWLY THAT OTHER PEOPLE COULD HAVE NOTICED. OR THE OPPOSITE, BEING SO FIGETY OR RESTLESS THAT YOU HAVE BEEN MOVING AROUND A LOT MORE THAN USUAL: 3
6. FEELING BAD ABOUT YOURSELF - OR THAT YOU ARE A FAILURE OR HAVE LET YOURSELF OR YOUR FAMILY DOWN: 0
SUM OF ALL RESPONSES TO PHQ QUESTIONS 1-9: 11

## 2021-06-02 NOTE — PATIENT INSTRUCTIONS
Plan:  Continue:  Lamictal, 25mg, take 2 tabs daily    Lamictal Cautions    Lamictal in rare occasions may cause a life threatening rash called Hensley-Te Syndrome. If you develop a rash, experience any swelling of the tongue, lips or eyes, then stop taking the medication. If the condition persists after stopping the medication, then go to an Urgent Care or to an Emergency Department to be seen and let them know that you have been taking Lamictal.     Follow up: Return in about 3 weeks (around 6/23/2021). If you become suicidal, follow up with this office or call the [facility] at [phone] ([phone]), or dial [phone].

## 2021-06-02 NOTE — PROGRESS NOTES
6/2/2021 4:46 PM   Progress Note    IN:  2800  OUT: [address] 1975      Chief Complaint   Patient presents with    Follow-up    Other     pseudoseizures, hearing music         Subjective:  Patient is a 40 y.o. female diagnosed with Mood Disorder and presents today with her brother for follow-up. Last seen in clinic on 4/21/21 and prior records were reviewed. Today patient states, \"OK. \" She reports no seizures since the last visit in April. Patient reports side effects as follows: none. No evidence of EPS, no cogwheeling or abnormal motor movements. Absent  suicidal ideation. Reports compliance with medications as good .      Current Substance Use:  See history    BP: BP (!) 156/87   Pulse 84   Temp 98.6 °F (37 °C)   Wt (!) 309 lb 4.8 oz (140.3 kg)   SpO2 94%   BMI 48.44 kg/m²       Review of Systems - 14 point review:  Negative except being treated for: asthma, hyperlipidemia, T2DM, HTN, pseudoseizures, morbidly obese    Constitutional: (fevers, chills, night sweats, wt loss/gain, change in appetite, fatigue, somnolence)    HEENT: (ear pain or discharge, hearing loss, ear ringing, sinus pressure, nosebleed, nasal discharge, sore throat, oral sores, tooth pain, bleeding gums, hoarse voice, neck pain)      Cardiovascular: (HTN, chest pain, elevated cholesterol/lipids, palpitations, leg swelling, leg pain with walking)    Respiratory: (cough, wheezing, snoring, SOB with activity (dyspnea), SOB while lying flat (orthopnea), awakening with severe SOB (paroxysmal nocturnal dyspnea))    Gastrointestinal: (NVD, constipation, abdominal pain, bright red stools, black tarry stools, stool incontinence)     Genitourinary:  (pelvic pain, burning or frequency of urination, urinary urgency, blood in urine incomplete bladder emptying, urinary incontinence, STD; MEN: testicular pain or swelling, erectile dysfunction; WOMEN: LMP, heavy menstrual bleeding (menorrhagia), irregular periods, postmenopausal bleeding, menstrual pain (dymenorrhea, vaginal discharge)    Musculoskeletal: (bone pain/fracture, joint pain or swelling, musle pain)    Integumentary: (rashes, acne, non-healing sores, itching, breast lumps, breast pain, nipple discharge, hair loss)    Neurologic: (HA, muscle weakness, paresthesias (numbness, coldness, crawling or prickling), memory loss, seizure, dizziness)    Psychiatric:  (anxiety, sadness, irritability/anger, insomnia, suicidality)    Endocrine: (heat or cold intolerance, excessive thirst (polydipsia), excessive hunger (polyphagia))    Immune/Allergic: (hives, seasonal or environmental allergies, HIV exposure)    Hematologic/Lymphatic: (lymph node enlargement, easy bleeding or bruising)    History obtained via chart review and patient    PCP is Edith Hendrickson MD       Current Meds:    Prior to Admission medications    Medication Sig Start Date End Date Taking? Authorizing Provider   lamoTRIgine (LAMICTAL) 25 MG tablet Take 2 tablets by mouth daily . 4/21/21   JAS Nino NP   HYDROcodone-acetaminophen (NORCO) 5-325 MG per tablet Take 1 tablet by mouth every 6 hours as needed.     Historical Provider, MD   insulin 70-30 (HUMULIN;NOVOLIN) (70-30) 100 UNIT per ML injection vial Inject 40 Units into the skin Take 40 units in the morning and 55 units at bedtime    Historical Provider, MD   metFORMIN (GLUCOPHAGE) 1000 MG tablet Take 1,000 mg by mouth 2 times daily (with meals)    Historical Provider, MD   amLODIPine (NORVASC) 10 MG tablet Take 10 mg by mouth daily    Historical Provider, MD   albuterol sulfate  (90 Base) MCG/ACT inhaler Inhale 2 puffs into the lungs every 6 hours as needed for Wheezing    Historical Provider, MD   Semaglutide, 1 MG/DOSE, (OZEMPIC, 1 MG/DOSE,) 2 MG/1.5ML SOPN Inject 1.5 mLs into the skin once a week    Historical Provider, MD   atorvastatin (LIPITOR) 20 MG tablet Take 20 mg by mouth daily    Historical Provider, MD   [place], raised in [place], [place]. Moved to [address] in August, 2020. She moved to [address] because her daughter was living in [facility] [address] Patient is , her  , Viktoriya Bran, is in intermediate, they  in 2016. They are . She has a fiance, Augustus Horne. She has two children, twin daughters, who are 17yo from her first  Hans Reyes). Both twins live in the area. She is  from the first ,  in 2001. Is in the process of trying to get a divorce from Viktoriya Bran, 2nd . Trauma and/or Abuse - Finding her mother dead in 2009 was traumatic    Legal - denies    Substance Use - see history    Work History - see history    Education - see history     status - denies    . PAST SUICIDE ATTEMPTS:    no    .    INPATIENT HOSPITALIZATIONS:    no    .    DRUG REHABILITATION:    no    .    Mood Disorder Questionnaire: + 8    Question 2: Yes  Question 3: minor    4/21/21: [place] Sleepiness Scale: 12    4/21/21: SLUMS, 19/30     . PSYCHIATRIC REVIEW OF SYSTEMS, 12/16/2020    . Mood:  negative (she rates sleeping too much, problems with concentration and being fidgety on her PHQ9. This provider did not observe fidgety and restless but her speech is very slow and her movements are slow)      (Depression: sadness, tearfulness, sleep, appetite, energy, concentration, sexual function, guilt, psychomotor agitation or slowing, interest, suicidality)    . Allie: positive for reports that there are 3-4 times during the year when she has more energy, talks more, is hypersexual, has more energy, doesn't feel like sleeping, is easily distracted. She describes these periods of time as lasting 3-4 days. (impulsivity, grandiosity, recklessness, excessive energy, decreased need for sleep, increased spending beyond means, hyperverbal, grandiose, racing thoughts, hypersexuality)    . Other: negative      (Irritability, lability, anger)    .     Anxiety:  positive for feeling nervous, anxious, or on edge, not being able to stop or control worrying      (Generalized anxiety: where, when, who, how long, how frequent)    . Panic Disorder symptoms: positive for hx, hasn't had one since she was living in [facility], earlier in the summer of 2020      (Palpitations, racing heart beat, sweating, sense of impending doom, fear of recurrence, shortness of breath)    . OCD symptoms:  negative      (checking, cleaning, organizing, rituals, hang-ups, obsessive thoughts, counting, rational vs. Irrational beliefs)    . PTSD symptoms:  positive for dreams about her mother occasionally (last time was around September, 2020)      (nightmares, flashbacks, startle response, avoidance)    . Social anxiety symptoms:  negative    . Simple phobias: positive for spiders, heights      (heights, planes, spiders, etc.)    . Psychosis: positive for talking to herself sitting in the floor, acting like a kid, with her eyes open, saying that she was a  (several times), she says that sometimes she hears music and there is nothing in the house that is making music (she report the last time was a couple of weeks ago)      (hallucinations, auditory, visual, tactile, olfactory)    . Paranoia: negative    . Delusions:  negative      (TV, radio, thought broadcasting, mind control, referential thinking)      (persecutory delusion - e.g., believing one is being followed and harassed by gangs)      (grandiose delusion - e.g., believing one is a billionaire  who owns casinos around the world)      (erotomanic delusion - e.g., believing a famous  is in love with them)      (somatic delusion - e.g., believing one's sinuses have been infested by worms)       (delusions of reference - e.g., believing dialogue on a TV program is directed specifically towards the patient)      (delusions of control - e.g., believing one's thoughts and movements are controlled by planetary overlords)    .     Patient's perception: negative (Spiritual or cultural context of symptoms, reality testing)    . ADHD symptoms: negative      (able to focus and concentrate, scattered thoughts, disorganized thoughts)    . Eating Disorder symptoms:  negative      (binging, purging, excessive exercising)          Social Determinants of Health     Financial Resource Strain: Medium Risk    Difficulty of Paying Living Expenses: Somewhat hard   Food Insecurity: Food Insecurity Present    Worried About Running Out of Food in the Last Year: Sometimes true    Germain of Food in the Last Year: Never true   Transportation Needs: Unmet Transportation Needs    Lack of Transportation (Medical): Yes    Lack of Transportation (Non-Medical): No   Physical Activity: Sufficiently Active    Days of Exercise per Week: 3 days    Minutes of Exercise per Session: 60 min   Stress: Stress Concern Present    Feeling of Stress : Very much   Social Connections: Socially Integrated    Frequency of Communication with Friends and Family: More than three times a week    Frequency of Social Gatherings with Friends and Family: Three times a week    Attends Uatsdin Services: More than 4 times per year    Active Member of Clubs or Organizations: Yes    Attends Club or Organization Meetings: More than 4 times per year    Marital Status:    Intimate Partner Violence: Not At Risk    Fear of Current or Ex-Partner: No    Emotionally Abused: No    Physically Abused: No    Sexually Abused: No       MSE:  Patient is  A & O x 4. Appearance:  street clothes, fair grooming and fair hygiene, appropriately dressed for season and age. Cognition:  Recent memory intact , remote memory intact , poor fund of knowledge, average  intelligence level. Speech:  hesitant and slow  Language: Naming: intact;  Word Finding: intact  Conversation no evidence of delusions  Behavior:  Cooperative and Good eye contact  Mood: \"ok\"  Affect: incongruent with mood (has a flat affect)  Thought Content: negative delusions, positive for hearing music \"every once in a while\" hallucinations, negative obsessions,  negativehomicidal and negative suicidal   Thought Process: linear, goal directed, coherent and slow, disorganized  Associations: logical connections  Attention Span and concentration: Normal   Judgement Insight:  normal and appropriate  Gait and Station:normal gait and station   Sleep: avg 8-9 hrs (reports she is going to bed around 8p or 9p and getting up around 3am,  sometimes sleeps during the day - she says that she gets bored)  Appetite: ok      No results found for: NA, K, CL, CO2, BUN, CREATININE, GLUCOSE, CALCIUM, PROT, LABALBU, BILITOT, ALKPHOS, AST, ALT, LABGLOM, GFRAA, AGRATIO, GLOB  No results found for: NA, K, CL, CO2, BUN, CREATININE, GLUCOSE, CALCIUM   No results found for: CHOL  No results found for: TRIG  No results found for: HDL  No results found for: LDLCHOLESTEROL, LDLCALC  No results found for: LABVLDL, VLDL  No results found for: CHOLHDLRATIO  No results found for: LABA1C  No results found for: EAG  No results found for: TSHFT4, TSH  No results found for: VITD25    Assessments Administered:    PHQ9: 11,  moderate  GAD7:  10, moderate    [place] Sleepiness Scale: 12  SLUMS: 19/30    Cognition: Can spell \"world\" backwards: Yes                    Can do serial 7's: Yes    Assessment:   1. Mood disorder (HCC)         R/O Conversion Disorder  R/O Deficit or other Schizophrenia    Plan:  Continue:  Lamictal, 25mg, take 2 tabs daily    Lamictal Cautions    Lamictal in rare occasions may cause a life threatening rash called Hensley-Te Syndrome. If you develop a rash, experience any swelling of the tongue, lips or eyes, then stop taking the medication.  If the condition persists after stopping the medication, then go to an Urgent Care or to an Emergency Department to be seen and let them know that you have been taking Lamictal.     Follow up: Return in about 3 weeks (around 6/23/2021). If you become suicidal, follow up with this office or call the [facility] at [phone]), or dial 911.    1. The risks, benefits, side effects, indications, contraindications, and adverse effects of the medications have been discussed. Yes.  2. The pt has verbalized understanding and has capacity to give informed consent. 3. The Butch Aly report has been reviewed according to [facility] regulations. 4. Supportive therapy offered. 5. Follow up: Return in about 3 weeks (around 6/23/2021). 6. The patient has been advised to call with any problems. 7. Controlled substance Treatment Plan: none. 8. The above listed medications have been continued, modifications in meds and other orders/labs as follows: No orders of the defined types were placed in this encounter. No orders of the defined types were placed in this encounter. 9. Additional comments: Although her brother came with her for the visit, he was unable to offer collateral information. He says that her boyfriend or daughter would be better able to provide information as to history and what her current symptomology is. She reports no seizures since the last visit. A sleep study was ordered at the last visit. Will follow up on this at the next visit. Her report of sleep is better this visit. She continues to be an unreliable historian. She continues with a flat affect. She continues to say that she hears music but doesn't offer information this visit as to how often she hears music. Will follow up in about 3 weeks when either her daughter or boyfriend can come with her to provide collateral information.     10.Over 50% of the total visit time of 30 minutes was spent on counseling and/or coordination of care of:                        1. Mood disorder [facility])                      Psychotherapy Topics: mood/medication effectiveness       JAS Deal - NP PMHNP-BC

## 2021-06-04 ENCOUNTER — TELEPHONE (OUTPATIENT)
Dept: PSYCHIATRY | Age: 46
End: 2021-06-04

## 2021-06-07 ENCOUNTER — TELEPHONE (OUTPATIENT)
Dept: PSYCHIATRY | Age: 46
End: 2021-06-07

## 2021-06-22 ENCOUNTER — TELEPHONE (OUTPATIENT)
Dept: PSYCHIATRY | Age: 46
End: 2021-06-22

## 2021-06-25 ENCOUNTER — TELEPHONE (OUTPATIENT)
Dept: PSYCHIATRY | Age: 46
End: 2021-06-25

## 2021-06-28 ENCOUNTER — TELEPHONE (OUTPATIENT)
Dept: PSYCHIATRY | Age: 46
End: 2021-06-28

## 2021-06-28 NOTE — TELEPHONE ENCOUNTER
Again unable to reach pt's boyfriend. Spoke with pt and discussed again that it will take up to 5 days to determine if her insurance will cover the [place]. Pt encouraged to get the samples. She says she will be in town on 6/30/21 for medical appt and will come to the office and get samples then.

## 2021-06-28 NOTE — TELEPHONE ENCOUNTER
Phone PA completed for Latuda 20 mg by calling [phone] and speaking with Cee Brown. Clinical was given and sent for clinical review with a turn around time of 5 business day. Determination will be faxed to the office. Pt made aware and encouraged to have family  samples while waiting for the determination. She said her boyfriend was trying to get awake and would call back to discuss the above further.

## 2021-06-30 ENCOUNTER — OFFICE VISIT (OUTPATIENT)
Dept: NEUROLOGY | Age: 46
End: 2021-06-30
Payer: COMMERCIAL

## 2021-06-30 ENCOUNTER — TELEPHONE (OUTPATIENT)
Dept: PSYCHIATRY | Age: 46
End: 2021-06-30

## 2021-06-30 VITALS
OXYGEN SATURATION: 98 % | TEMPERATURE: 97.5 F | BODY MASS INDEX: 45.99 KG/M2 | HEART RATE: 80 BPM | HEIGHT: 67 IN | DIASTOLIC BLOOD PRESSURE: 87 MMHG | WEIGHT: 293 LBS | SYSTOLIC BLOOD PRESSURE: 139 MMHG

## 2021-06-30 DIAGNOSIS — G47.33 OBSTRUCTIVE SLEEP APNEA: Primary | ICD-10-CM

## 2021-06-30 DIAGNOSIS — G25.81 RESTLESS LEG SYNDROME: ICD-10-CM

## 2021-06-30 DIAGNOSIS — R06.83 SNORING: ICD-10-CM

## 2021-06-30 DIAGNOSIS — R40.0 SOMNOLENCE, DAYTIME: ICD-10-CM

## 2021-06-30 PROCEDURE — 99213 OFFICE O/P EST LOW 20 MIN: CPT | Performed by: PHYSICIAN ASSISTANT

## 2021-06-30 NOTE — TELEPHONE ENCOUNTER
Received fax from [facility] denying the PA for Scar Jc due to pt had not been tried on  one of the step 1 atypical antipsychotic medications. Pt has samples of the Scar Jc for now. Will discuss the denial in detail with Reche Chamber APRN when she returns to the office next week. Pt made aware of the above info-she will continue with the samples at this time.

## 2021-06-30 NOTE — PATIENT INSTRUCTIONS
Patient education: Sleep apnea in adults       INTRODUCTION  Normally during sleep, air moves through the throat and in and out of the lungs at a regular rhythm. In a person with sleep apnea, air movement is periodically diminished or stopped. There are two types of sleep apnea: obstructive sleep apnea and central sleep apnea. In obstructive sleep apnea, breathing is abnormal because of narrowing or closure of the throat. In central sleep apnea, breathing is abnormal because of a change in the breathing control and rhythm. Sleep apnea is a serious condition that can affect a person's ability to safely perform normal daily activities and can affect long term health. Approximately 25 percent of adults are at risk for sleep apnea of some degree. Men are more commonly affected than women. Other risk factors include middle and older age, being overweight or obese, and having a small mouth and throat. This topic review focuses on the most common type of sleep apnea in adults, obstructive sleep apnea (DARIEL). HOW SLEEP APNEA OCCURS  The throat is surrounded by muscles that control the airway for speaking, swallowing, and breathing. During sleep, these muscles are less active, and this causes the throat to narrow. In most people, this narrowing does not affect breathing. In others, it can cause snoring, sometimes with reduced or completely blocked airflow. A completely blocked airway without airflow is called an obstructive apnea. Partial obstruction with diminished airflow is called a hypopnea. A person may have apnea and hypopnea during sleep. Insufficient breathing due to apnea or hypopnea causes oxygen levels to fall and carbon dioxide to rise. Because the airway is blocked, breathing faster or harder does not help to improve oxygen levels until the airway is reopened. Typically, the obstruction requires the person to awaken to activate the upper airway muscles.  Once the airway is opened, the person then takes several deep breaths to catch up on breathing. As the person awakens, he or she may move briefly, snort or snore, and take a deep breath. Less frequently, a person may awaken completely with a sensation of gasping, smothering, or choking. If the person falls back to sleep quickly, he or she will not remember the event. Many people with sleep apnea are unaware of their abnormal breathing in sleep, and all patients underestimate how often their sleep is interrupted. Awakening from sleep causes sleep to be unrefreshing and causes fatigue and daytime sleepiness. Anatomic causes of obstructive sleep apnea   Most patients have DARIEL because of a small upper airway. As the bones of the face and skull develop, some people develop a small lower face, a small mouth, and a tongue that seems too large for the mouth. These features are genetically determined, which explains why DARIEL tends to cluster in families. Obesity is another major factor. Tonsil enlargement can be an important cause, especially in children. SLEEP APNEA SYMPTOMS  The main symptoms of DARIEL are loud snoring, fatigue, and daytime sleepiness. However, some people have no symptoms. For example, if the person does not have a bed partner, he or she may not be aware of the snoring. Fatigue and sleepiness have many causes and are often attributed to overwork and increasing age. As a result, a person may be slow to recognize that they have a problem. A bed partner or spouse often prompts the patient to seek medical care. Other symptoms may include one or more of the following:  ?Restless sleep  ? Awakening with choking, gasping, or smothering  ? Morning headaches, dry mouth, or sore throat  ? Waking frequently to urinate  ? Awakening unrested, groggy  ? Low energy, difficulty concentrating, memory impairment    Risk factors  Certain factors increase the risk of sleep apnea.   ?Increasing age [de-identified] DARIEL occurs at all ages, but it is more common in middle and older age usually performed in a sleep laboratory. A full sleep study is called a polysomnogram. The polysomnogram measures the breathing effort and airflow, blood oxygen level, heart rate and rhythm, duration of the various stages of sleep, body position, and movement of the arms/legs. Home monitoring devices are available that can perform a sleep study. This is a reasonable alternative to conventional testing in a sleep laboratory if the clinician strongly suspects moderate or severe sleep apnea and the patient does not have other illnesses or sleep disorders that may interfere with the results. SLEEP APNEA TREATMENT  Sleep apnea is best treated by a knowledgeable sleep medicine specialist. The goal of treatment is to maintain an open airway during sleep. Effective treatment will eliminate the symptoms of sleep disturbance; long-term health consequences are also reduced. Most treatments require nightly use. The challenge for the clinician and the patient is to select an effective therapy that is appropriate for the patient's problem and that is acceptable for long term use. Auto-titrating CPAP delivers an amount of PAP that varies during the night. The variation is dependent on event detection software algorithms, which will increase the pressure gradually in response to flow changes until adequate patency is detected. After a period of sustained upper airway patency, the delivered level of pressure gradually decreases until the algorithm identifies recurrent upper airway obstruction, at which point the delivered pressure again increases. The result is that the delivered pressure varies throughout the night, in an effort to provide the lowest pressure that is necessary to maintain upper airway patency. Continuous positive airway pressure (CPAP)  The most effective treatment for sleep apnea uses air pressure from a mechanical device to keep the upper airway open during sleep.  A CPAP (continuous positive airway pressure)  device uses an air-tight attachment to the nose, typically a mask, connected to a tube and a blower which generates the pressure. Devices that fit comfortably into the nasal opening, rather than over the nose, are also available. CPAP should be used any time the person sleeps (day or night). The CPAP device is usually used for the first time in the sleep lab, where a technician can adjust the pressure and select the best equipment to keep the airway open. Alternatively, an auto device with a self-adjusting pressure feature, provided with proper education and training, can get treatment started without another sleep test. While the treatment may seem uncomfortable, noisy, or bulky at first, most people accept the treatment after experiencing better sleep. However, difficulty with mask comfort and nasal congestion prevent up to 50 percent of people from using the treatment on a regular basis. Continued follow up with a healthcare provider helps to ensure that the treatment is effective and comfortable. Information from the CPAP machine is often used by physicians, therapists, and insurers to track the success of treatment. CPAP can be delivered with different features to improve comfort and solve problems that may come up during treatment. Changes in treatment may be needed if symptoms do not improve or if the persons condition changes, such as a gain or loss of weight. Adjust sleep position  Adjusting sleep position (to stay off the back) may help improve sleep quality in people who have DARIEL when sleeping on the back. However, this is difficult to maintain throughout the night and is rarely an adequate solution. Weight loss  Weight loss may be helpful for obese or overweight patients. Weight loss may be accomplished with dietary changes, exercise, and/or surgical treatment.  However, it can be difficult to maintain weight loss; the five-year success of non-surgical weight loss is only 5 percent, meaning that 95 percent of people regain lost weight. Avoid alcohol and other sedatives  Alcohol can worsen sleepiness, potentially increasing the risk of accidents or injury. People with DARIEL are often counseled to drink little to no alcohol, even during the daytime. Similarly, people who take anti-anxiety medications or sedatives to sleep should speak with their healthcare provider about the safety of these medications. People with DARIEL must notify all healthcare providers, including surgeons, about their condition and the potential risks of being sedated. People with DARIEL who are given anesthesia and/or pain medications require special management and close monitoring to reduce the risk of a blocked airway. Dental devices  A dental device, called an oral appliance or mandibular advancement device, can reposition the jaw (mandible), bringing the tongue and soft palate forward as well. This may relieve obstruction in some people. This treatment is excellent for reducing snoring, although the effect on DARIEL is sometimes more limited. As a result, dental devices are best used for mild cases of DARIEL when relief of snoring is the main goal. Failure to tolerate and accept CPAP is another indication for dental devices. While dental devices are not as effective as CPAP for DARIEL, some patients prefer a dental device to CPAP. Side effects of dental devices are generally minor but may include changes to the bite with prolonged use. Surgical treatment  Surgery is an alternative therapy for patients who cannot tolerate or do not improve with nonsurgical treatments such as CPAP or oral devices. Surgery can also be used in combination with other nonsurgical treatments. Surgical procedures reshape structures in the upper airways or surgically reposition bone or soft tissue. Uvulopalatopharyngoplasty (UPPP) removes the uvula and excessive tissue in the throat, including the tonsils, if present.  Other procedures, such as maxillomandibular advancement (MMA), address both the upper and lower pharyngeal airway more globally. UPPP alone has limited success rates (less than 50 percent) and people can relapse (when DARIEL symptoms return after surgery). As a result, this surgery is only recommended in a minority of people and should be considered with caution. MMA may have a higher success rate, particularly in people with abnormal jaw (maxilla and mandible) anatomy, but it is the most complicated procedure. A newer surgical approach, nerve stimulation to protrude the tongue, has promising success rates in very selected people. Tracheostomy creates a permanent opening in the neck. It is reserved for people with severe disease in whom less drastic measures have failed or are inappropriate. Although it is always successful in eliminating obstructive sleep apnea, tracheostomy requires significant lifestyle changes and carries some serious risks (eg, infection, bleeding, blockage). All surgical treatments require discussions about the goals of treatment, the expected outcomes, and potential complications. Hypoglossal nerve stimulator- \"Inspire\" device    PAP treatment failure:  Possible causes of treatment failure include nonadherence or suboptimal adherence, weight gain, an inappropriate level of prescribed positive pressure, or an additional disorder causing sleepiness (eg, narcolepsy) that may require alterations in the therapeutic regimen. A review of medications should also be undertaken since many drugs may lead to sleepiness. Inadequate sleep time may also negate the expected effects from treatment of DARIEL. Also, pt's can have persistent hypersomnolence associated with sleep apnea even in the presence of adequate therapy and at those times Provigil or Nuvigil or other stimulants may be indicated.     Once the patient's positive airway pressure therapy has been optimized and symptoms resolved, a regimen of long-term follow-up should be established. Annual visits are reasonable, with more frequent visits in between if new issues arise. The purpose of long-term follow-up is to assess usage and monitor for recurrent DARIEL, new side effects, air leakage, and fluctuations in body weight. WHERE TO GET MORE INFORMATION  Your healthcare provider is the best source of information for questions and concerns related to your medical problem. Organizations  [employer]  Provides information about sleep apnea to the public, publishes a newsletter, and serves as an advocate for people with the disorder. [place], [address] [facility], [address] [facility]   [email or web address]   [email or web address]   Tel: [phone]   Fax: [facility] that works to [employer] and safety by promoting public understanding of sleep and sleep disorders. Supports sleep-related education, research, and advocacy; produces and distributes educational materials to the public and healthcare professionals; and offers postdoctoral fellowships and grants for sleep researchers. [address]   [email or web address]   [email or web address]   Tel: [phone]   Fax: [phone]    Important information:  Medicare/private insurance CPAP/BiPAP/APAP requirements:  Medicare/private insurance has specific requirements for PAP compliance that must be met during the first 90 days of use to continue coverage for CPAP/BiPAP/APAP  from day 91 and beyond. The policy requires that patients use a PAP device 4 hours per 24 hour period, at least 70% of the time over a 30 day period. This data must be downloaded as a report direct from the PAP devices. This is called a compliance download. Your PAP supplier will assist you in this matter.      Reminder:  Please bring a copy of the compliance download to your next office visit or have your supplier fax it to our office prior to your office visit. Note:  Where applicable, we will utilize PAP device efficiency reports, additional testing, and face-to-face  clinical evaluation subsequent to any treatment, changes in treatment, and continued treatment. Caution:  Please abstain from driving or engaging in other activities which may be hazardous in the presence of diminished alertness or daytime drowsiness. And avoid the use of sedatives or alcohol, which can worsen sleep apnea and daytime drowsiness. Mask suggestions:  -     Resmed Airfit N20 (Nasal) or F20 (Full face mask). They conform to your face, thus decreasing the potential for mask leakage. You might like the AirTouch F20(full face mask). It has a \"memory foam\" like cushion. The AirFit F30 is a smaller style full face mask designed to sit low on and cover less of your face for fewer facial marks. AirFit N30i has a top of the head tube with a nasal mask. AirFit P10 reported to be the most comfortable nasal pillow mask. Resmed Mirage FX reported to be the most comfortable nasal mask. Resmed Mirage [place] reported to be the most comfortable hybrid mask. AirTouch N20-memory foam nasal mask. Respironics: You might also like to try a nasal mask called a Dreamwear nasal mask or the Dreamwear nasal pillow. Another suggestion is the [facility], it is a minimal contact full face mask. The [place] Gallus incredible under the nose design makes it the only full face mask that won't cause red marks on the bridge of your nose when compared to other full face masks. The Dreamwear full face mask has a  soft feel, unique in-frame air-flow, and innovative air tube connection at the top of the head for the ultimate in sleep comfort. Comfort Gel Blue. Dreamwear gel pillows. Yaw & Rk: Brevida nasal pillow mask and Simplus FFM    The use of a memory foam CPAP pillow supports the head and neck throughout the night.

## 2021-06-30 NOTE — PROGRESS NOTES
REVIEW OF SYSTEMS    Constitutional: []Fever []Sweats []Chills [] Recent Injury   [x] Denies all unless marked  HENT:[]Headache  [] Head Injury  [] Sore Throat  [] Ear Pain  [] Dizziness [] Hearing Loss   [x] Denies all unless marked  Musculoskeletal: [] Arthralgia  [] Myalgias [] Muscle cramps  [] Muscle twitches   [x] Denies all unless marked   Spine:  [] Neck pain  [] Back pain  [] Sciaticia  [x] Denies all unless marked  Neurological:[] Visual Disturbance [] Double Vision [] Slurred Speech [] Trouble swallowing  [] Vertigo [] Tingling [] Numbness [] Weakness [] Loss of Balance   [] Loss of Consciousness [] Memory Loss [] Seizures  [x] Denies all unless marked  Psychiatric/Behavioral:[] Depression [] Anxiety  [x] Denies all unless marked  Sleep: [x]  Insomnia [x] Sleep Disturbance [x] Snoring [x] Restless Legs [x] Daytime Sleepiness [] Sleep Apnea  [] Denies all unless marked

## 2021-06-30 NOTE — PROGRESS NOTES
[facility]  [facility] [address], [address]  [facility], [address]  Phone [phone]  Fax [phone] SLEEP    Patient: David Mckinley  :  1975  Age:  40 y.o. MRN:  [id number]  Today:             21    Provider:  Aristeo Torres PA-C    Chief Complaint:  Chief Complaint   Patient presents with    New Patient     POSSIBLE SLEEP APNEA       History Source: History obtained from chart review and the patient. PCP: Belen Huerta MD     Referring Provider: JAS Carrasco - NP  [address]  [place] [address]    HISTORY OF PRESENT ILLNESS:   David Mckinley is a 40y.o. year old female with a history of seizures, pseudoseizures, hypertension, diabetes, depression, anxiety, bipolar disorder, disordered schizophrenia, COPD,  and asthma who was referred for a sleep evaluation. It is reported that she sleeps 10-12 hours. She goes to bed at 11:30 pm and wakes up at 10:00 am. Despite the long sleep time, she persists to be drowsy during the day. She naps a lot. The naps are not refreshing. She really doesn't feel like doing anything but nap during the day. Sometimes she has frequent awakenings. She has symptoms of RLS. She snores and has excessive daytime somnolence. She falls asleep when sedentary. She no longer drives due to her history of seizures. She does not wake up gasping or choking. She denies night sweats. She has had hypnagogic and hypnopompic hallucinations in the past. She denies sleep paralysis and cataplexy. She can fall asleep when she is standing. She has gained weight over the last year but she has also lost weight. Her weight fluctuates.        PAST MEDICAL HITORY:    Medical History:  Past Medical History:   Diagnosis Date    Anxiety     Asthma     COPD (chronic obstructive pulmonary disease) ([address]     Depression     Diabetes mellitus [facility]     Hypertension     Seizures ([facility]     and pseudoseizures which started in  after her mother ) Surgical History:  Past Surgical History:   Procedure Laterality Date    CARPAL TUNNEL RELEASE       SECTION      CHOLECYSTECTOMY, LAPAROSCOPIC         Current Medications:  Current Outpatient Medications   Medication Sig Dispense Refill    lurasidone (LATUDA) 20 MG TABS tablet Take 1 tablet by mouth daily 30 tablet 3    HYDROcodone-acetaminophen (NORCO) 5-325 MG per tablet Take 1 tablet by mouth every 6 hours as needed.  insulin 70-30 (HUMULIN;NOVOLIN) (70-30) 100 UNIT per ML injection vial Inject 40 Units into the skin Take 40 units in the morning and 55 units at bedtime      metFORMIN (GLUCOPHAGE) 1000 MG tablet Take 1,000 mg by mouth 2 times daily (with meals)      amLODIPine (NORVASC) 10 MG tablet Take 10 mg by mouth daily      albuterol sulfate  (90 Base) MCG/ACT inhaler Inhale 2 puffs into the lungs every 6 hours as needed for Wheezing      Semaglutide, 1 MG/DOSE, (OZEMPIC, 1 MG/DOSE,) 2 MG/1.5ML SOPN Inject 1.5 mLs into the skin once a week      atorvastatin (LIPITOR) 20 MG tablet Take 20 mg by mouth daily      Cholecalciferol (VITAMIN D3) 1.25 MG (93193 UT) CAPS Take 1 capsule by mouth once a week      empagliflozin (JARDIANCE) 25 MG tablet Take 25 mg by mouth daily      meloxicam (MOBIC) 15 MG tablet Take 15 mg by mouth 2 times daily       No current facility-administered medications for this visit. Allergies:  Pcn [penicillins]    SOCIAL HISTORY:   Social History     Substance and Sexual Activity   Alcohol Use Never     Social History     Substance and Sexual Activity   Drug Use Never     Social History     Tobacco Use   Smoking Status Never Smoker   Smokeless Tobacco Never Used       FAMILY HISTORY:   No family history on file. REVIEW OF SYSTEMS     Constitutional: []? Fever []? Sweats []? Chills []? Recent Injury   [x]? Denies all unless marked  HENT:[]? Headache  []? Head Injury  []? Sore Throat  []? Ear Pain  []? Dizziness []? Hearing Loss   [x]?  Denies all unless marked  Musculoskeletal: []? Arthralgia  []? Myalgias []? Muscle cramps  []? Muscle twitches   [x]? Denies all unless marked   Spine:  []? Neck pain  []? Back pain  []? Sciaticia  [x]? Denies all unless marked  Neurological:[]? Visual Disturbance []? Double Vision []? Slurred Speech []? Trouble swallowing  []? Vertigo []? Tingling []? Numbness []? Weakness []? Loss of Balance   []? Loss of Consciousness []? Memory Loss []? Seizures  [x]? Denies all unless marked  Psychiatric/Behavioral:[x]? Depression [x]? Anxiety  [x]? Denies all unless marked  Sleep: [x]? Insomnia [x]? Sleep Disturbance [x]? Snoring [x]? Restless Legs [x]? Daytime Sleepiness []? Sleep Apnea  []? Denies all unless marked    The MA has completed the ROS with the patient. I have reviewed it in its' entirety with the patient and agree with the documentation. PHYSICAL EXAM  /87 (Site: Left Upper Arm, Position: Sitting, Cuff Size: Large Adult)   Pulse 80   Temp 97.5 °F (36.4 °C)   Ht 5' 7\" (1.702 m)   Wt (!) 309 lb (140.2 kg)   SpO2 98%   Breastfeeding No   BMI 48.40 kg/m²    Neck circumference:  19.5 inches  Mallampati: Type 3  Constitutional   Alert in NAD, well developed, pleasant and cooperative with exam; body habitus morbidly obese as indicated by BMI  HEENT- Conjunctiva normal.  No scars, masses, or lesions over external nose or ears, hearing intact, no neck masses noted, no jugular vein distension, no bruit  Cardiac- Regular rate and rhythm  Pulmonary- Clear to auscultation, good expansion, normal effort without use of accessory muscles  Musculoskeletal  No significant wasting of muscles noted, no bony deformities  Extremities - No clubbing, cyanosis; 1+ peripheral edema  Skin  Warm, dry, and intact. No rash, erythema, or pallor  Psychiatric  Mood and behavior appear normal; flat affect; speaks quietly     NEUROLOGIC EXAMINATION:  Extraocular movements are intact without nystagmus. PERRL.  Visual fields are full to confrontation. Facial movements are symmetrical and normal.  Speech is precise. Extremity strength is normal in both uppers and lowers. Deep tendon reflexes are intact and symmetrical.  Rapid alternating movements are unimpaired. Finger-to-nose testing is performed well, without dysmetria. Gait is normal.    I reviewed the following studies:       []  :  Clinical laboratory test results     []  :  Radiology reports                    [x]  :  Review and summarization of medical records and/or obtain medical records        []  :  Previous/recent polysomnogram report(s)     [x]  :  [place] Sleepiness Scale: 21    [place] Sleepiness Scale    0 = would never doze  1 = slight chance of dozing  2 = moderate chance of dozing  3 = high chance of dozing    Situation Chance of Dozing (0-3)    Sitting and reading       3    Watching TV        3    Sitting, inactive in a public place (e.g. a theatre or a meeting) 3    As a passenger in a car for an hour without a break   3    Lying down to rest in the afternoon when circumstances permit 3    Sitting and talking to someone     3    Sitting quietly after a lunch without alcohol    3    In a car, while stopped for a few minutes in the traffic  0    Total         21       IMPRESSION:    ICD-10-CM    1. Obstructive sleep apnea  G47.33 Baseline Diagnostic Sleep Study     [facility]   2. Somnolence, daytime  R40.0 Baseline Diagnostic Sleep Study     [facility]   3. Snoring  R06.83 Baseline Diagnostic Sleep Study     [facility]   4. Restless leg syndrome  G25.81 Baseline Diagnostic Sleep Study     [facility]   5. BMI 45.0-49.9, adult [facility])  Z68.42 Baseline Diagnostic Sleep Study     [facility]        PLAN:  1. Orders Placed This Encounter   Procedures   [address] [address]    Baseline Diagnostic Sleep Study     2.   Patient advised of the etiology,  pathophysiology, diagnosis, treatment options, and risks of untreated DARIEL. Risks may include, but are not limited to  hypertension, coronary artery disease, atrial fibrillation, CHF, diabetes, stroke, weight gain, impaired cognition, daytime somnolence,  and motor vehicle accidents. Advised to abstain from driving or operating heavy machinery when drowsy and the use of respiratory suppressants. 3.  We had a discussion about the clinical issues and went over the various important aspects to consider. Treatment plan and potential diagnostic studies were discussed. All questions were answered. Pt voiced understanding and agrees with treatment plan. 4. The following educational material has been included in this visit after visit summary for your review:  DARIEL/PAP guidelines/sleep studies/CPAP-discussed with pt.  5.  If pt requires a PAP device she will be required to be evaluated for clinical benefit and  compliance during a 30 day period within the preceding 90 days of set up. 6.  Order-PSG  7.   Follow up per protocol

## 2021-07-12 ENCOUNTER — TELEPHONE (OUTPATIENT)
Dept: PSYCHIATRY | Age: 46
End: 2021-07-12

## 2021-07-12 NOTE — TELEPHONE ENCOUNTER
Deena Feliciano at [phone] spoke with [facility] regarding procedure to appeal the [place] denial for Latuda. She reported that there was no specific form to complete. She stated to send signed documentation per [address] [place] [address] and clinical documentation supporting the appeal.  Josefina MARK made aware. She will see the pt on 7/20/21 for evaluation. Will send in updated documentation in support of Latuda at that time for the appeal.  Pt is receiving the med per samples for now. I have reviewed and confirmed nurses' notes...

## 2021-07-20 ENCOUNTER — OFFICE VISIT (OUTPATIENT)
Dept: PSYCHIATRY | Age: 46
End: 2021-07-20
Payer: COMMERCIAL

## 2021-07-20 VITALS
HEART RATE: 90 BPM | OXYGEN SATURATION: 93 % | SYSTOLIC BLOOD PRESSURE: 170 MMHG | TEMPERATURE: 99.3 F | DIASTOLIC BLOOD PRESSURE: 83 MMHG | WEIGHT: 293 LBS | BODY MASS INDEX: 50.12 KG/M2

## 2021-07-20 DIAGNOSIS — F20.1 DISORGANIZED SCHIZOPHRENIA (HCC): Primary | ICD-10-CM

## 2021-07-20 PROCEDURE — 99214 OFFICE O/P EST MOD 30 MIN: CPT | Performed by: NURSE PRACTITIONER

## 2021-07-20 ASSESSMENT — PATIENT HEALTH QUESTIONNAIRE - PHQ9
1. LITTLE INTEREST OR PLEASURE IN DOING THINGS: 2
SUM OF ALL RESPONSES TO PHQ QUESTIONS 1-9: 18
SUM OF ALL RESPONSES TO PHQ9 QUESTIONS 1 & 2: 4
9. THOUGHTS THAT YOU WOULD BE BETTER OFF DEAD, OR OF HURTING YOURSELF: 0
8. MOVING OR SPEAKING SO SLOWLY THAT OTHER PEOPLE COULD HAVE NOTICED. OR THE OPPOSITE, BEING SO FIGETY OR RESTLESS THAT YOU HAVE BEEN MOVING AROUND A LOT MORE THAN USUAL: 3
4. FEELING TIRED OR HAVING LITTLE ENERGY: 2
3. TROUBLE FALLING OR STAYING ASLEEP: 3
7. TROUBLE CONCENTRATING ON THINGS, SUCH AS READING THE NEWSPAPER OR WATCHING TELEVISION: 3
SUM OF ALL RESPONSES TO PHQ QUESTIONS 1-9: 18
2. FEELING DOWN, DEPRESSED OR HOPELESS: 2
5. POOR APPETITE OR OVEREATING: 3
6. FEELING BAD ABOUT YOURSELF - OR THAT YOU ARE A FAILURE OR HAVE LET YOURSELF OR YOUR FAMILY DOWN: 0
SUM OF ALL RESPONSES TO PHQ QUESTIONS 1-9: 18

## 2021-07-20 ASSESSMENT — ANXIETY QUESTIONNAIRES
4. TROUBLE RELAXING: 1-SEVERAL DAYS
GAD7 TOTAL SCORE: 12
7. FEELING AFRAID AS IF SOMETHING AWFUL MIGHT HAPPEN: 1-SEVERAL DAYS
1. FEELING NERVOUS, ANXIOUS, OR ON EDGE: 2-OVER HALF THE DAYS
3. WORRYING TOO MUCH ABOUT DIFFERENT THINGS: 2-OVER HALF THE DAYS
6. BECOMING EASILY ANNOYED OR IRRITABLE: 1-SEVERAL DAYS
5. BEING SO RESTLESS THAT IT IS HARD TO SIT STILL: 2-OVER HALF THE DAYS
2. NOT BEING ABLE TO STOP OR CONTROL WORRYING: 3-NEARLY EVERY DAY

## 2021-07-20 NOTE — PATIENT INSTRUCTIONS
Plan:  Increase:  Lurasidone, 40mg, daily      Stopped on a phone call, 6/25/21: Lamictal      Follow up: Return in about 4 weeks (around 8/17/2021). Patient Education        lurasidone  Pronunciation:  anaya HAMEED i done  Brand:  Latuda  What is the most important information I should know about lurasidone? Lurasidone is not approved for use in older adults with dementia-related psychosis. Some people have thoughts about suicide while taking lurasidone. Stay alert to changes in your mood or symptoms. Report any new or worsening symptoms to your doctor. Tell your doctor about all your current medicines and any you start or stop using. Many drugs can interact, and some drugs should not be used together. What is lurasidone? Lurasidone is an antipsychotic medicine that is used to treat schizophrenia in adults and teenagers who are at least 15years old. Lurasidone is also used to treat episodes of depression related to bipolar disorder (manic depression) in adults and children who are at least 8years old. Lurasidone may also be used for purposes not listed in this medication guide. What should I discuss with my healthcare provider before taking lurasidone? You should not use lurasidone if you are allergic to it. Many drugs can interact and cause dangerous effects. Some drugs should not be used together with lurasidone. Your doctor may change your treatment plan if you also use:  · antifungal medicine such as ketoconazole or voriconazole;  · an antibiotic such as clarithromycin or rifampin;  · an antiviral such as ritonavir;  · Knowlton's wort; or  · seizure medicine such as carbamazepine or phenytoin. Lurasidone may increase the risk of death in older adults with dementia-related psychosis and is not approved for this use.   Tell your doctor if you have ever had:  · heart disease or a stroke;  · high or low blood pressure;  · high cholesterol or triglycerides (a type of fat in the blood);  · diabetes or high blood sugar (in you or your family);  · a seizure;  · liver or kidney disease;  · low white blood cell (WBC) counts;  · abnormal hormone function tests (thyroid, pituitary gland);  · breast cancer; or  · suicidal thoughts or actions. Some people have thoughts about suicide while taking lurasidone. Your doctor will need to check your progress at regular visits. Your family or other caregivers should also be alert to changes in your mood or symptoms. Taking antipsychotic medicine in the last 3 months of pregnancy may cause breathing problems, feeding problems, or withdrawal symptoms in the . If you get pregnant, tell your doctor right away. Do not stop taking lurasidone without your doctor's advice. If you are pregnant, your name may be listed on a pregnancy registry to track the effects of lurasidone on the baby. It may not be safe to breastfeed a baby while you are using this medicine. Ask your doctor about any risks. Lurasidone is not approved for schizophrenia  in anyone younger than 15years old. Lurasidone is not approved for depression in anyone younger than 8years old. How should I take lurasidone? Follow all directions on your prescription label and read all medication guides or instruction sheets. Use the medicine exactly as directed. Lurasidone should be taken with food (at least 350 calories). You may need frequent blood tests. It may take several weeks before your symptoms improve. Keep using the medication as directed. Call your doctor if your symptoms do not improve, or if they get worse while using lurasidone. You should not stop using lurasidone suddenly. Stopping suddenly may cause other problems. It is easier to become dangerously overheated and dehydrated while you are taking lurasidone. Drink plenty of fluids, especially in hot weather and during exercise. You may also be more sensitive to temperature extremes (hot or cold).   Store at room temperature away from moisture and heat.  What happens if I miss a dose? Take the medicine as soon as you can, but skip the missed dose if it is almost time for your next dose. Do not take two doses at one time. Get your prescription refilled before you run out of medicine completely. What happens if I overdose? Seek emergency medical attention or call the Poison Help line at 1-157.270.9647. What should I avoid while taking lurasidone? Avoid drinking alcohol. Dangerous side effects could occur. Avoid driving or operating machinery until you know how this medicine will affect you. Avoid getting up too fast from a sitting or lying position, or you may feel dizzy. Dizziness or drowsiness can cause falls, accidents, or severe injuries. Grapefruit and grapefruit juice may interact with lurasidone and lead to unwanted side effects. Avoid the use of grapefruit products while taking lurasidone. What are the possible side effects of lurasidone? Get emergency medical help if you have signs of an allergic reaction: hives; difficulty breathing; swelling of your face, lips, tongue, or throat. Report any new or worsening symptoms to your doctor, such as: mood or behavior changes, anxiety, panic attacks, trouble sleeping, or if you feel impulsive, irritable, agitated, hostile, aggressive, restless, hyperactive (mentally or physically), more depressed, or have thoughts about suicide or hurting yourself. High doses or long-term use of lurasidone can cause a serious movement disorder that may not be reversible. The longer you use lurasidone, the more likely you are to develop this disorder, especially if you are a woman or an older adult. Call your doctor at once if you have:  · any new or unusual muscle movements you cannot control;  · a light-headed feeling, like you might pass out;  · a seizure (convulsions);   · (in women) irregular menstrual periods, breast or vaginal changes, nipple discharge;  · (in men) breast swelling, impotence;  · trouble swallowing;  · manic episodes --racing thoughts, increased energy, decreased need for sleep, risk-taking behavior, being agitated or talkative;  · low white blood cell counts --fever, chills, mouth sores, skin sores, sore throat, cough, trouble breathing;  · high blood sugar --increased thirst, increased urination, hunger, dry mouth, fruity breath odor; or  · severe nervous system reaction --very stiff (rigid) muscles, high fever, sweating, confusion, fast or uneven heartbeats, tremors, feeling like you might pass out. Common side effects may include:  · drowsiness;  · weight gain;  · tremors, muscle stiffness, slow muscle movement;  · feeling restless or being unable to sit still;  · nausea, vomiting;  · runny nose; or  · sleep problems (insomnia). This is not a complete list of side effects and others may occur. Call your doctor for medical advice about side effects. You may report side effects to FDA at 5-294-FDA-2037. What other drugs will affect lurasidone? Using lurasidone with other drugs that slow your breathing can cause dangerous side effects or death. Ask your doctor before using opioid medication, a sleeping pill, a muscle relaxer, or medicine for anxiety or seizures. Tell your doctor about all your current medicines. Many drugs can affect lurasidone, especially:  · depression or psychotic episodes;  · sleep problems (insomnia);  · high blood pressure or a heart rhythm disorder;  · swelling or inflammation;  · seizures; or  · Parkinson's disease. This list is not complete and many other drugs may affect lurasidone. This includes prescription and over-the-counter medicines, vitamins, and herbal products. Not all possible drug interactions are listed here. Where can I get more information? Your pharmacist can provide more information about lurasidone.   Remember, keep this and all other medicines out of the reach of children, never share your medicines with others, and use this medication only for

## 2021-07-20 NOTE — PROGRESS NOTES
2021 1:01 PM   Progress Note    IN:  1115  OUT: [address] 1975      Chief Complaint   Patient presents with    Follow-up    Other     hallucinations, auditory/visual, negative s/s of schizophrenia         Subjective:  Patient is a 40 y.o. female diagnosed with Mood Disorder and presents today with her fiance, Hannah Alvarez, for follow-up. Last seen in clinic on 21 and prior records were reviewed. Today the patient's fiance, \"I can't really say whether the medicine has worked or not. \" He reports that neurology here diagnosed her with pseudoseizures. He reports that she meets 90% of the symptoms on the schizophrenia educational handout that was sent with her brother at the last visit. He reports that he has seen her talking to things that are not really there, such as walking up to the wall and trying to shake someone's hand (there was no one there), thinking she was going swimming in the dog's dish. He reports seizure-like activities in about , when her mother . He reports that he has known the patient since about . He reports a delay in speech. He reports that not long ago she got up in the early hours she thought she was going to a concert. In  he reports that she had 3 back-to-back seizure like episodes. He has observed her talking to people who are not there. He showed this provider videos at home. She expresses that this makes her nervous and she doesn't like him showing this provider videos. He reports that she plays with stuffed animals at home who all have names and personalities. He reports that she has had some pseudoseizures since the last visit. Patient reports side effects as follows: none. No evidence of EPS, no cogwheeling or abnormal motor movements. Absent  suicidal ideation. Reports compliance with medications as good .      Current Substance Use:  See history    BP: BP (!) 170/83   Pulse 90   Temp 99.3 °F (37.4 °C)   Wt (!) 320 lb (145.2 kg)   SpO2 93%   BMI 50.12 kg/m²       Review of Systems - 14 point review:  Negative except being treated for: asthma, hyperlipidemia, T2DM, HTN, pseudoseizures, morbidly obese, hallucinations, delusions    Constitutional: (fevers, chills, night sweats, wt loss/gain, change in appetite, fatigue, somnolence)    HEENT: (ear pain or discharge, hearing loss, ear ringing, sinus pressure, nosebleed, nasal discharge, sore throat, oral sores, tooth pain, bleeding gums, hoarse voice, neck pain)      Cardiovascular: (HTN, chest pain, elevated cholesterol/lipids, palpitations, leg swelling, leg pain with walking)    Respiratory: (cough, wheezing, snoring, SOB with activity (dyspnea), SOB while lying flat (orthopnea), awakening with severe SOB (paroxysmal nocturnal dyspnea))    Gastrointestinal: (NVD, constipation, abdominal pain, bright red stools, black tarry stools, stool incontinence)     Genitourinary:  (pelvic pain, burning or frequency of urination, urinary urgency, blood in urine incomplete bladder emptying, urinary incontinence, STD; MEN: testicular pain or swelling, erectile dysfunction; WOMEN: LMP, heavy menstrual bleeding (menorrhagia), irregular periods, postmenopausal bleeding, menstrual pain (dymenorrhea, vaginal discharge)    Musculoskeletal: (bone pain/fracture, joint pain or swelling, musle pain)    Integumentary: (rashes, acne, non-healing sores, itching, breast lumps, breast pain, nipple discharge, hair loss)    Neurologic: (HA, muscle weakness, paresthesias (numbness, coldness, crawling or prickling), memory loss, seizure, dizziness)    Psychiatric:  (anxiety, sadness, irritability/anger, insomnia, suicidality)    Endocrine: (heat or cold intolerance, excessive thirst (polydipsia), excessive hunger (polyphagia))    Immune/Allergic: (hives, seasonal or environmental allergies, HIV exposure)    Hematologic/Lymphatic: (lymph node enlargement, easy bleeding or bruising)    History obtained via chart review and patient    PCP is Wally Flanagan MD       Current Meds:    Prior to Admission medications    Medication Sig Start Date End Date Taking? Authorizing Provider   lurasidone (LATUDA) 40 MG TABS tablet Take 1 tablet by mouth daily 7/20/21  Yes JAS Fritz NP   HYDROcodone-acetaminophen (NORCO) 5-325 MG per tablet Take 1 tablet by mouth every 6 hours as needed.     Historical Provider, MD   insulin 70-30 (HUMULIN;NOVOLIN) (70-30) 100 UNIT per ML injection vial Inject 40 Units into the skin Take 40 units in the morning and 55 units at bedtime    Historical Provider, MD   metFORMIN (GLUCOPHAGE) 1000 MG tablet Take 1,000 mg by mouth 2 times daily (with meals)    Historical Provider, MD   amLODIPine (NORVASC) 10 MG tablet Take 10 mg by mouth daily    Historical Provider, MD   albuterol sulfate  (90 Base) MCG/ACT inhaler Inhale 2 puffs into the lungs every 6 hours as needed for Wheezing    Historical Provider, MD   Semaglutide, 1 MG/DOSE, (OZEMPIC, 1 MG/DOSE,) 2 MG/1.5ML SOPN Inject 1.5 mLs into the skin once a week    Historical Provider, MD   atorvastatin (LIPITOR) 20 MG tablet Take 20 mg by mouth daily    Historical Provider, MD   Cholecalciferol (VITAMIN D3) 1.25 MG (46112 UT) CAPS Take 1 capsule by mouth once a week    Historical Provider, MD   empagliflozin (JARDIANCE) 25 MG tablet Take 25 mg by mouth daily    Historical Provider, MD   meloxicam (MOBIC) 15 MG tablet Take 15 mg by mouth 2 times daily    Historical Provider, MD     Social History     Socioeconomic History    Marital status: Legally      Spouse name: Razia Rehman Number of children: 2    Years of education: 15    Highest education level: High school graduate   Occupational History    Not on file   Tobacco Use    Smoking status: Never Smoker    Smokeless tobacco: Never Used   Vaping Use    Vaping Use: Never used   Substance and Sexual Activity    Alcohol use: Never    Drug use: Never    Sexual activity: Not Currently Comment: no on birth control   Other Topics Concern    Not on file   Social History Narrative    20    PRIOR MEDICATION TRIALS    Duloxetine (hasn't taken since )    Lithium (long time ago)    Lamictal (current)        . SLEEP STUDY: no    [place] Sleepiness Scale, 21: 12 (she reports that she naps in the day from boredom)    . 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 negative history of head trauma. [place] PREVIOUS PSYCHIATRIC HISTORY    She saw a mental health provider in [place], [place]. She was seeing the provider for depression and anxiety. She says that she was in her 19's and 30's when she was seeing this provider. She stopped seeing the provider because she didn't have insurance to pay for the treatment. She says that she hasn't seen a mental health provider since then except for [facility] who she saw in . She is not currently on any psychiatric medications. [place] FAMILY PSYCHIATRIC HISTORY    Paternal grandmother, anxiety, the family found her in her closet one time talking to someone that wasn't there    . SOCIAL HISTORY    Born and Raised - [place] [employer] [place] [facility], [place], raised in [place]. [place] [address] [place] . [place] [address] because her daughter was [place] [address]. Patient is , her  , Sanaz Hernandez, is in jail, they  in . They are . She has a fiance, Hannah Roy. She has two children, twin daughters, who are 17yo from her first  Rocio Davenport. Both twins live in the area. She is  from the first ,  in . Is in the process of trying to get a divorce from Sanaz Hernandez, 2nd . Trauma and/or Abuse - Finding her mother dead in  was traumatic    Legal - denies    Substance Use - see history    Work History - see history    Education - see history     status - denies    .     PAST SUICIDE ATTEMPTS:    no    .    INPATIENT HOSPITALIZATIONS:    no    .    DRUG REHABILITATION:    no    .    Mood Disorder Questionnaire: + 8    Question 2: Yes  Question 3: minor    4/21/21: [place] Sleepiness Scale: 12    4/21/21: SLUMS, 19/30     . PSYCHIATRIC REVIEW OF SYSTEMS, 12/16/2020    . Mood:  negative (she rates sleeping too much, problems with concentration and being fidgety on her PHQ9. This provider did not observe fidgety and restless but her speech is very slow and her movements are slow)      (Depression: sadness, tearfulness, sleep, appetite, energy, concentration, sexual function, guilt, psychomotor agitation or slowing, interest, suicidality)    . Allie: positive for reports that there are 3-4 times during the year when she has more energy, talks more, is hypersexual, has more energy, doesn't feel like sleeping, is easily distracted. She describes these periods of time as lasting 3-4 days. (impulsivity, grandiosity, recklessness, excessive energy, decreased need for sleep, increased spending beyond means, hyperverbal, grandiose, racing thoughts, hypersexuality)    . Other: negative      (Irritability, lability, anger)    . Anxiety:  positive for feeling nervous, anxious, or on edge, not being able to stop or control worrying      (Generalized anxiety: where, when, who, how long, how frequent)    . Panic Disorder symptoms: positive for hx, hasn't had one since she was living in [facility], earlier in the summer of 2020      (Palpitations, racing heart beat, sweating, sense of impending doom, fear of recurrence, shortness of breath)    . OCD symptoms:  negative      (checking, cleaning, organizing, rituals, hang-ups, obsessive thoughts, counting, rational vs. Irrational beliefs)    . PTSD symptoms:  positive for dreams about her mother occasionally (last time was around September, 2020)      (nightmares, flashbacks, startle response, avoidance)    . Social anxiety symptoms:  negative    .     Simple phobias: positive for spiders, heights      (heights, planes, spiders, etc.)    . Psychosis: positive for talking to herself sitting in the floor, acting like a kid, with her eyes open, saying that she was a  (several times), she says that sometimes she hears music and there is nothing in the house that is making music (she report the last time was a couple of weeks ago)      (hallucinations, auditory, visual, tactile, olfactory)    . Paranoia: negative    . Delusions:  negative      (TV, radio, thought broadcasting, mind control, referential thinking)      (persecutory delusion - e.g., believing one is being followed and harassed by gangs)      (grandiose delusion - e.g., believing one is a billionaire  who owns casinos around the world)      (erotomanic delusion - e.g., believing a famous  is in love with them)      (somatic delusion - e.g., believing one's sinuses have been infested by worms)       (delusions of reference - e.g., believing dialogue on a TV program is directed specifically towards the patient)      (delusions of control - e.g., believing one's thoughts and movements are controlled by planetary overlords)    . Patient's perception: negative      (Spiritual or cultural context of symptoms, reality testing)    . ADHD symptoms: negative      (able to focus and concentrate, scattered thoughts, disorganized thoughts)    . Eating Disorder symptoms:  negative      (binging, purging, excessive exercising)          Social Determinants of Health     Financial Resource Strain: Medium Risk    Difficulty of Paying Living Expenses: Somewhat hard   Food Insecurity: Food Insecurity Present    Worried About Running Out of Food in the Last Year: Sometimes true    Germain of Food in the Last Year: Never true   Transportation Needs: Unmet Transportation Needs    Lack of Transportation (Medical): Yes    Lack of Transportation (Non-Medical):  No   Physical Activity: Sufficiently Active    Days of Exercise per Week: 3 days    Minutes of Exercise per Session: 60 min   Stress: Stress Concern Present    Feeling of Stress : Very much   Social Connections: Socially Integrated    Frequency of Communication with Friends and Family: More than three times a week    Frequency of Social Gatherings with Friends and Family: Three times a week    Attends Catholic Services: More than 4 times per year    Active Member of Clubs or Organizations: Yes    Attends Club or Organization Meetings: More than 4 times per year    Marital Status:    Intimate Partner Violence: Not At Risk    Fear of Current or Ex-Partner: No    Emotionally Abused: No    Physically Abused: No    Sexually Abused: No       MSE:  Patient is  A & O x 4. Appearance:  street clothes, fair grooming and fair hygiene, appropriately dressed for season and age. Cognition:  Recent memory intact , remote memory intact , poor fund of knowledge, average  intelligence level. Speech:  hesitant and slow, soft  Language: Naming: intact;  Word Finding: intact  Conversation no evidence of delusions  Behavior:  Cooperative and Good eye contact  Mood:  \"ok\"  Affect: incongruent with mood (has a flat affect)  Thought Content: negative delusions, positive for hearing music \"every once in a while\" hallucinations, negative obsessions,  negativehomicidal and negative suicidal   Thought Process: linear, goal directed, coherent and slow, disorganized  Associations: logical connections  Attention Span and concentration: Normal   Judgement Insight:  normal and appropriate  Gait and Station:normal gait and station   Sleep: avg 8-9 hrs (reports she is going to bed around 8p or 9p and getting up around 3am,  sometimes sleeps during the day - she says that she gets bored)  Appetite: ok      No results found for: NA, K, CL, CO2, BUN, CREATININE, GLUCOSE, CALCIUM, PROT, LABALBU, BILITOT, ALKPHOS, AST, ALT, LABGLOM, GFRAA, AGRATIO, GLOB  No results found for: NA, K, CL, CO2, BUN, CREATININE, GLUCOSE, CALCIUM   No results found for: CHOL  No results found for: TRIG  No results found for: HDL  No results found for: LDLCHOLESTEROL, LDLCALC  No results found for: LABVLDL, VLDL  No results found for: CHOLHDLRATIO  No results found for: LABA1C  No results found for: EAG  No results found for: TSHFT4, TSH  No results found for: VITD25    Assessments Administered:    PHQ9: 18, severe (she likely had sleep apnea)  GAD7: 12, moderate    Cognition: Can spell \"world\" backwards: Yes                    Can do serial 7's: yes (with difficulty)     Assessment:   1. Disorganized schizophrenia ([address]         R/O Conversion Disorder      Plan:  Increase:  Lurasidone, 40mg, daily      Stopped on a phone call, 6/25/21: Lamictal      Follow up: Return in about 4 weeks (around 8/17/2021). If you become suicidal, follow up with this office or call the [facility] at [phone]), or dial [phone].    1. The risks, benefits, side effects, indications, contraindications, and adverse effects of the medications have been discussed. Yes.  2. The pt has verbalized understanding and has capacity to give informed consent. 3. The Quincy Quinn report has been reviewed according to [facility] regulations. 4. Supportive therapy offered. 5. Follow up: Return in about 4 weeks (around 8/17/2021). 6. The patient has been advised to call with any problems. 7. Controlled substance Treatment Plan: none. 8. The above listed medications have been continued, modifications in meds and other orders/labs as follows:      Orders Placed This Encounter   Medications    lurasidone (LATUDA) 40 MG TABS tablet     Sig: Take 1 tablet by mouth daily     Dispense:  30 tablet     Refill:  3        No orders of the defined types were placed in this encounter. 9. Additional comments: She had reading difficulties because she thought the letters were jumping off the page.  Her jose describes auditory and visual hallucinations. Her speech continues to be delayed and her thoughts appear to be disorganized. He jose says that she meets 90% of the schizophrenia handout from [facility] that was given to the patient at the last visit. Will increase Lurasidone to 40mg today. Will follow up in about 4 weeks.     10.Over 50% of the total visit time of 39 minutes was spent on counseling and/or coordination of care of:                        1. Disorganized schizophrenia ([facility] [address]                      Psychotherapy Topics: mood/medication effectiveness       Ken Palmer, APRN - NP PMHNP-BC

## 2021-07-22 ENCOUNTER — TELEPHONE (OUTPATIENT)
Dept: PSYCHIATRY | Age: 46
End: 2021-07-22

## 2021-08-23 ENCOUNTER — TELEPHONE (OUTPATIENT)
Dept: PSYCHIATRY | Age: 46
End: 2021-08-23

## 2021-08-23 NOTE — TELEPHONE ENCOUNTER
Called pt was a no show.  Called to check on them and  -unable to leave Vm    Electronically signed by Danisha Alvarado MA on 8/23/2021 at 3:34 PM

## 2021-08-23 NOTE — TELEPHONE ENCOUNTER
Called pt was a no show.  Called to check on them and  -Pt asked to reschedule and was rescheduled  Electronically signed by Marcia Aiken MA on 8/23/2021 at 3:38 PM

## 2021-09-13 ENCOUNTER — TELEPHONE (OUTPATIENT)
Dept: NEUROLOGY | Age: 46
End: 2021-09-13

## 2021-09-13 NOTE — TELEPHONE ENCOUNTER
Called and spoke to the patient Thelma Woodard to let her know that she needed to get her referring provider to send her a new referral to a [place] provider that would accept [place] insurance. Patient voiced understanding of this information.

## 2021-09-13 NOTE — TELEPHONE ENCOUNTER
Called and spoke with the office staff at [facility] to let them know that the patients insurance had changed [place] a [place] medicade and would need to be referred to a provider that accepted her insurance so could order the patient a sleep study where her insurance will pay for it. The office understood this information.

## 2021-09-15 ENCOUNTER — TELEPHONE (OUTPATIENT)
Dept: NEUROLOGY | Age: 46
End: 2021-09-15

## 2021-09-15 NOTE — TELEPHONE ENCOUNTER
----- Message from Da Griffin sent at 9/8/2021 12:14 PM CDT -----  Regarding: RE: change sleep lab  Okay.  ----- Message -----  From: Mario Robison  Sent: 9/8/2021  12:05 PM CDT  To: Namrataarvind Gaby  Subject: RE: change sleep lab                             I will have to ask Kailey Hendricks when she come's back on what she wants to do. I feel she will say she will need to be seen by a neurologist that takes her insurance and have them set up her to have the sleep study done in [facility] but I don't want to speak for her so I'll ask her once she returns to the office next week.  ----- Message -----  From: Da Griffin  Sent: 9/8/2021  11:40 AM CDT  To: Mariojenni Robison  Subject: change sleep lab                                 Shelton Yanes,    This pts insurance has changed to a [place] plan that we do not take. Pt called the sleep lab in [place], [place] and they do take her insurance. Can you send the referral to them? I think their name changed to [facility] in [place], [place]. You will probably need to write a new order since the one in her chart has our name on it.       Thank you,  [place]

## 2021-09-22 ENCOUNTER — TELEPHONE (OUTPATIENT)
Dept: PSYCHIATRY | Age: 46
End: 2021-09-22

## 2021-09-22 NOTE — TELEPHONE ENCOUNTER
Called Josh to get determination of the Appeal of the denial of Latuda- per automation this is no longer an active policy. Pt is getting the medication per samples at this time.

## 2021-09-22 NOTE — TELEPHONE ENCOUNTER
Spoke with staff at [address] [place] [address] who verified that pt no longer had [employer]. She now has [employer]. She stated that they have never received a RX for the Latuda 40 mg.

## 2021-09-23 ENCOUNTER — TELEPHONE (OUTPATIENT)
Dept: PSYCHIATRY | Age: 46
End: 2021-09-23

## 2021-09-23 NOTE — TELEPHONE ENCOUNTER
Called pt was a no show.  Called to check on them and    -Pt asked to reschedule and was rescheduled 10/20 @ 3      Electronically signed by Santiago Metzger MA on 9/23/2021 at 3:22 PM

## 2021-09-29 ENCOUNTER — TELEPHONE (OUTPATIENT)
Dept: PSYCHIATRY | Age: 46
End: 2021-09-29

## 2021-09-30 ENCOUNTER — TELEPHONE (OUTPATIENT)
Dept: PSYCHIATRY | Age: 46
End: 2021-09-30

## 2021-09-30 NOTE — TELEPHONE ENCOUNTER
Attempted to reach the pt again today-went straight to recording that said caller has restrictions that prevent the completion of the call. Attempted to call her boyfriend, Jordan Smith, went straight to VM that stated the VM box had not been set up. Santiago MARK made aware.

## 2021-10-19 ENCOUNTER — TELEPHONE (OUTPATIENT)
Dept: PSYCHIATRY | Age: 46
End: 2021-10-19

## 2021-10-19 NOTE — TELEPHONE ENCOUNTER
Called patient to remind them their appointment with JAS Hermosillo on 10/20/21 @ 3:00PM. Patient confirmed their appointment.

## 2021-10-20 ENCOUNTER — TELEPHONE (OUTPATIENT)
Dept: PSYCHIATRY | Age: 46
End: 2021-10-20

## 2021-10-20 ENCOUNTER — OFFICE VISIT (OUTPATIENT)
Dept: PSYCHIATRY | Age: 46
End: 2021-10-20
Payer: COMMERCIAL

## 2021-10-20 VITALS
HEART RATE: 74 BPM | BODY MASS INDEX: 45.99 KG/M2 | WEIGHT: 293 LBS | HEIGHT: 67 IN | OXYGEN SATURATION: 98 % | SYSTOLIC BLOOD PRESSURE: 144 MMHG | DIASTOLIC BLOOD PRESSURE: 76 MMHG | TEMPERATURE: 98.3 F

## 2021-10-20 DIAGNOSIS — F39 MOOD DISORDER (HCC): Primary | ICD-10-CM

## 2021-10-20 PROCEDURE — 99215 OFFICE O/P EST HI 40 MIN: CPT | Performed by: NURSE PRACTITIONER

## 2021-10-20 NOTE — PROGRESS NOTES
is from the ER trying to get blood and putting in IV access. She is quiet and guarded during assessment. She forwards little information and is hesitant in interacting with provider. Patient stated that she has intermittent hallucinations she stated that she saw a man's face in a wall he had a beard and hair but no arms or legs her Blase Musty stated that he saw her trying to interact with the wall. It was reported by Ana Ahn that patient's father attempted to get disability on patient when she was younger for learning disability. She was instructed to find a therapist in her area, she was encouraged to find a sleep clinic in her area as well. She denies si hi avh at this time. She is overweight and poorly groomed. She had dirty fingernails and was malodorous. She reports lack of interest in managing her hygiene. Absent  suicidal ideation. Reports compliance with medications as good . Sleep: avg 10-11 hrs   Has been referred to a sleep study in [place]. Pt denies excessive spending, mood swings, irritability, manic or hypomanic episodes. Pt denies SI, HI, Auditory, visual, and tactile hallucinations at this time. Appetite: same    Caffeine use: none    Support:  jose      PREVIOUS MED TRIALS    Duloxetine (hasn't taken since 2006)  Lithium (long time ago)  Lamictal     Current Substance Use:   Alcohol: Denies   illicit drug use: Denies   Marijuana: Denies   Tobacco use: Denies   Vape: Denies     FAMILY PSYCHIATRIC HISTORY        Social History  Marital status- currently  but is trying to get a divorce, currently engaged to [employer]. Has been with mahendra for 5 years  Trauma and/or Abuse - found her mother, passed away in her sleep in 2009. Pt was 36  Children- 2 girls. Has a good relationship with them  Legal - pursuing divorces   Work History - trying to get disability.  DDD anxiety and depression  Education - highschool   status - negative      BP: BP (!) 144/76   Pulse 74 Temp 98.3 °F (36.8 °C)   Ht 5' 7\" (1.702 m)   Wt 298 lb 8 oz (135.4 kg)   SpO2 98%   BMI 46.75 kg/m²       Review of Systems - 14 point review:  Negative     Constitutional: (fevers, chills, night sweats, wt loss/gain, change in appetite, fatigue, somnolence)    HEENT: (ear pain or discharge, hearing loss, ear ringing, sinus pressure, nosebleed, nasal discharge, sore throat, oral sores, tooth pain, bleeding gums, hoarse voice, neck pain)      Cardiovascular: (HTN, chest pain, elevated cholesterol/lipids, palpitations, leg swelling, leg pain with walking)    Respiratory: (cough, wheezing, snoring, SOB with activity (dyspnea), SOB while lying flat (orthopnea), awakening with severe SOB (paroxysmal nocturnal dyspnea))    Gastrointestinal: (NVD, constipation, abdominal pain, bright red stools, black tarry stools, stool incontinence)     Genitourinary:  (pelvic pain, burning or frequency of urination, urinary urgency, blood in urine incomplete bladder emptying, urinary incontinence, STD; MEN: testicular pain or swelling, erectile dysfunction; WOMEN: LMP, heavy menstrual bleeding (menorrhagia), irregular periods, postmenopausal bleeding, menstrual pain (dymenorrhea, vaginal discharge)    Musculoskeletal: (bone pain/fracture, joint pain or swelling, musle pain)    Integumentary: (rashes, acne, non-healing sores, itching, breast lumps, breast pain, nipple discharge, hair loss)    Neurologic: (HA, muscle weakness, paresthesias (numbness, coldness, crawling or prickling), memory loss, seizure, dizziness)    Psychiatric:  (anxiety, sadness, irritability/anger, insomnia, suicidality)    Endocrine: (heat or cold intolerance, excessive thirst (polydipsia), excessive hunger (polyphagia))    Immune/Allergic: (hives, seasonal or environmental allergies, HIV exposure)    Hematologic/Lymphatic: (lymph node enlargement, easy bleeding or bruising)    History obtained via chart review and patient    PCP is Harvey Holland MD Past Medical History:   Diagnosis Date    Anxiety     Asthma     [facility]     Depression     Diabetes mellitus ([facility]     Hypertension     Seizures ([facility]     and pseudoseizures which started in  after her mother )         Psychiatric Review of Systems    Mood Depression:  positive sadness,  tearfulness, increased sleep,  decreased energy, decreased concentration, decreased sexual function,  decreased interest,    Allie: negative    Mood Other:negative    Anxiety: positive (where, when, who, how long, how frequent) hard being around people. Will go shopping with her brother. Panic: negative     OCD: negative    PTSD: negative    Psychosis: positive: reports seeing a glenny face in the wall. he had a beard but she can not recall much information    Social anxiety symptoms:  positive     Simple phobias: negative    (heights, planes, spiders, etc.)    Paranoia: negative    ADHD symptoms: negative      Eating Disorder symptoms:  negative    (binging, purging, excessive exercising)    Delusions:  negative    (TV, radio, thought broadcasting, mind control, referential thinking)    (persecutory delusion - e.g., believing one is being followed and harassed by gangs)    (grandiose delusion - e.g., believing one is a billionaire  who owns casinos around the world)    (erotomanic delusion - e.g., believing a famous  is in love with them)    (somatic delusion - e.g., believing one's sinuses have been infested by worms)    (delusions of reference - e.g., believing dialogue on a TV program is directed specifically towards the patient)    (delusions of control - e.g., believing one's thoughts and movements are controlled by planetary overlords)       Current Meds:    Prior to Admission medications    Medication Sig Start Date End Date Taking?  Authorizing Provider   lurasidone (LATUDA) 20 MG TABS tablet Take 1 tablet by mouth daily 10/20/21  Sunil CABEZAS Cathy Nobles, APRN - CNP   HYDROcodone-acetaminophen (NORCO) 5-325 MG per tablet Take 1 tablet by mouth every 6 hours as needed.     Historical Provider, MD   insulin 70-30 (HUMULIN;NOVOLIN) (70-30) 100 UNIT per ML injection vial Inject 40 Units into the skin Take 40 units in the morning and 55 units at bedtime    Historical Provider, MD   metFORMIN (GLUCOPHAGE) 1000 MG tablet Take 1,000 mg by mouth 2 times daily (with meals)    Historical Provider, MD   amLODIPine (NORVASC) 10 MG tablet Take 10 mg by mouth daily    Historical Provider, MD   albuterol sulfate  (90 Base) MCG/ACT inhaler Inhale 2 puffs into the lungs every 6 hours as needed for Wheezing    Historical Provider, MD   Semaglutide, 1 MG/DOSE, (OZEMPIC, 1 MG/DOSE,) 2 MG/1.5ML SOPN Inject 1.5 mLs into the skin once a week    Historical Provider, MD   atorvastatin (LIPITOR) 20 MG tablet Take 20 mg by mouth daily    Historical Provider, MD   Cholecalciferol (VITAMIN D3) 1.25 MG (28373 UT) CAPS Take 1 capsule by mouth once a week    Historical Provider, MD   empagliflozin (JARDIANCE) 25 MG tablet Take 25 mg by mouth daily    Historical Provider, MD   meloxicam (MOBIC) 15 MG tablet Take 15 mg by mouth 2 times daily    Historical Provider, MD     Social History     Socioeconomic History    Marital status: Legally      Spouse name: Den Patel Number of children: 2    Years of education: 15    Highest education level: High school graduate   Occupational History    Not on file   Tobacco Use    Smoking status: Never Smoker    Smokeless tobacco: Never Used   Vaping Use    Vaping Use: Never used   Substance and Sexual Activity    Alcohol use: Never    Drug use: Never    Sexual activity: Not Currently     Comment: no on birth control   Other Topics Concern    Not on file   Social History Narrative    12/16/20    PRIOR MEDICATION TRIALS    Duloxetine (hasn't taken since 2006)    Lithium (long time ago)    Lamictal         .    SLEEP STUDY: no Clemons Sleepiness Scale, 21: 12 (she reports that she naps in the day from boredom)    . 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 negative history of head trauma. Wendy Vail PREVIOUS PSYCHIATRIC HISTORY    She saw a mental health provider in [place], [place]. She was seeing the provider for depression and anxiety. She says that she was in her 19's and 30's when she was seeing this provider. She stopped seeing the provider because she didn't have insurance to pay for the treatment. She says that she hasn't seen a mental health provider since then except for DEMARCUS Barbosa who she saw in . She is not currently on any psychiatric medications. Wendy Vail FAMILY PSYCHIATRIC HISTORY    Paternal grandmother, anxiety, the family found her in her closet one time talking to someone that wasn't there    . SOCIAL HISTORY    Born and Raised - Born on [address] [facility] in [facility]. [place], [place], raised in [place], [place]. Moved to [address] [place] [address] in . She moved to [address] [place] [address] S because her daughter was living in [address] [place] [address] S. Patient is , her  , Prince Leslie, is in senior living, they  in . They are . She has a fiance, Rafaellamar Vo. She has two children, twin daughters, who are 17yo from her first  Isi Baer). Both twins live in the area. She is  from the first ,  in . Is in the process of trying to get a divorce from [place] Leslie, 2nd . Trauma and/or Abuse - Finding her mother dead in  was traumatic    Legal - denies    Substance Use - see history    Work History - see history    Education - see history     status - denies    . PAST SUICIDE ATTEMPTS:    no    .    INPATIENT HOSPITALIZATIONS:    no    .    DRUG REHABILITATION:    no    .    Mood Disorder Questionnaire: + 8    Question 2: Yes  Question 3: minor    21: Clemons Sleepiness Scale: 12    21: SLUMS,      . PSYCHIATRIC REVIEW OF SYSTEMS, 2020    . Mood:  negative (she rates sleeping too much, problems with concentration and being fidgety on her PHQ9. This provider did not observe fidgety and restless but her speech is very slow and her movements are slow)      (Depression: sadness, tearfulness, sleep, appetite, energy, concentration, sexual function, guilt, psychomotor agitation or slowing, interest, suicidality)    . Allie: positive for reports that there are 3-4 times during the year when she has more energy, talks more, is hypersexual, has more energy, doesn't feel like sleeping, is easily distracted. She describes these periods of time as lasting 3-4 days. (impulsivity, grandiosity, recklessness, excessive energy, decreased need for sleep, increased spending beyond means, hyperverbal, grandiose, racing thoughts, hypersexuality)    . Other: negative      (Irritability, lability, anger)    . Anxiety:  positive for feeling nervous, anxious, or on edge, not being able to stop or control worrying      (Generalized anxiety: where, when, who, how long, how frequent)    . Panic Disorder symptoms: positive for hx, hasn't had one since she was living in [facility], earlier in the summer of 2020      (Palpitations, racing heart beat, sweating, sense of impending doom, fear of recurrence, shortness of breath)    . OCD symptoms:  negative      (checking, cleaning, organizing, rituals, hang-ups, obsessive thoughts, counting, rational vs. Irrational beliefs)    . PTSD symptoms:  positive for dreams about her mother occasionally (last time was around September, 2020)      (nightmares, flashbacks, startle response, avoidance)    . Social anxiety symptoms:  negative    . Simple phobias: positive for spiders, heights      (heights, planes, spiders, etc.)    .     Psychosis: positive for talking to herself sitting in the floor, acting like a kid, with her eyes open, saying that she was a  (several times), she says that sometimes she hears music and there is nothing in the house that is making music (she report the last time was a couple of weeks ago)      (hallucinations, auditory, visual, tactile, olfactory)    . Paranoia: negative    . Delusions:  negative      (TV, radio, thought broadcasting, mind control, referential thinking)      (persecutory delusion - e.g., believing one is being followed and harassed by gangs)      (grandiose delusion - e.g., believing one is a billionaire  who owns casinos around the world)      (erotomanic delusion - e.g., believing a famous  is in love with them)      (somatic delusion - e.g., believing one's sinuses have been infested by worms)       (delusions of reference - e.g., believing dialogue on a TV program is directed specifically towards the patient)      (delusions of control - e.g., believing one's thoughts and movements are controlled by planetary overlords)    . Patient's perception: negative      (Spiritual or cultural context of symptoms, reality testing)    . ADHD symptoms: negative      (able to focus and concentrate, scattered thoughts, disorganized thoughts)    . Eating Disorder symptoms:  negative      (binging, purging, excessive exercising)          Social Determinants of Health     Financial Resource Strain:     Difficulty of Paying Living Expenses:    Food Insecurity:     Worried About Running Out of Food in the Last Year:     [address] in the Last Year:    Transportation Needs:     Lack of Transportation (Medical):      Lack of Transportation (Non-Medical):    Physical Activity:     Days of Exercise per Week:     Minutes of Exercise per Session:    Stress:     Feeling of Stress :    Social Connections:     Frequency of Communication with Friends and Family:     Frequency of Social Gatherings with Friends and Family:     Attends Congregational Services:     Active Member of Clubs or Organizations:     Attends Club or Organization Meetings:    [facility] Marital Status:    Intimate Partner Violence:     Fear of Current or Ex-Partner:     Emotionally Abused:     Physically Abused:     Sexually Abused:        MSE:  Appearance: flat affect, averted eye contact, disheveled. Gait stable. No abnormal movements or tremor. Behavior: Calm, cooperative, and socially appropriate. No psychomotor retardation/agitation appreciated. Speech: soft and delayed   Mood: \"ok\"   Affect: Mood congruent. Thought Process: Appears linear, logical and goal oriented. Thought Content: Denies active suicidal and homicidal ideations. No overt delusions or paranoia appreciated. Perceptions: Denies auditory or visual hallucinations at present time. Not responding to internal stimuli. Concentration: poor   Orientation: to person, place, date, and situation. Language: Intact. Fund of information: Intact. Memory: Recent and remote appear intact. Impulsivity: Limited. Neurovegitative: Fair appetite and sleep. Insight: poor. Judgment: poor. No results found for: NA, K, CL, CO2, BUN, CREATININE, GLUCOSE, CALCIUM, PROT, LABALBU, BILITOT, ALKPHOS, AST, ALT, LABGLOM, GFRAA, AGRATIO, GLOB  No results found for: NA, K, CL, CO2, BUN, CREATININE, GLUCOSE, CALCIUM   No results found for: CHOL  No results found for: TRIG  No results found for: HDL  No results found for: LDLCHOLESTEROL, LDLCALC  No results found for: LABVLDL, VLDL  No results found for: CHOLHDLRATIO  No results found for: LABA1C  No results found for: EAG  No results found for: TSHFT4, TSH  No results found for: VITD25  No results found for: COJOIUIO55   No results found for: FOLATE     Assessment:   1. Mood disorder (HCC)        No evidence of acute suicidality, homicidality or psychosis observed.   Patient is psychiatrically stable    Plan:    1.   decrease   Lurasidone to 20mg, daily     Start      Discontinue      The risks, benefits, side effects, indications, contraindications, and adverse effects of the medications have been discussed. Yes.  2. The pt has verbalized understanding and has capacity to give informed consent. 3. The Sendy Love report has been reviewed according to [facility] regulations. 4. Supportive therapy offered. 5. Follow up: Return in about 6 weeks (around 12/1/2021). 6. The patient has been advised to call with any problems. 7. Controlled substance Treatment Plan: NA.  8. The above listed medications have been continued, modifications in meds and other orders/labs as follows:      Orders Placed This Encounter   Medications    lurasidone (LATUDA) 20 MG TABS tablet     Sig: Take 1 tablet by mouth daily     Dispense:  30 tablet     Refill:  2      No orders of the defined types were placed in this encounter. 9. Additional comments: begin  therapy, discussed sleep hygiene, discussed the use of coping skills and relaxation strategies to manage symptoms. 10.Over 50% of the total visit time of   45  minutes was spent on counseling and/or coordination of care of:                        1. Mood disorder [facility])                      Psychotherapy Topics: mood/medication effectiveness family, health and drug and alcohol    JAS Castillo - CNP    This dictation was generated by voice recognition computer software. Although all attempts are made to edit the dictation for accuracy, there may be errors in the transcription that are not intended.

## 2021-10-20 NOTE — TELEPHONE ENCOUNTER
Per direction of WILFREDO MARK, pt and boyfriend given Latuda 20 mg samples #42 with direction to take one daily with evening meal of at least 350 calories. Pt and boy friend given verbal and written med education-they verbalized understanding and plans to follow.

## 2021-10-21 ENCOUNTER — TELEPHONE (OUTPATIENT)
Dept: PSYCHIATRY | Age: 46
End: 2021-10-21

## 2021-10-22 ENCOUNTER — TELEPHONE (OUTPATIENT)
Dept: PSYCHIATRY | Age: 46
End: 2021-10-22

## 2021-10-22 NOTE — TELEPHONE ENCOUNTER
Spoke with pt regarding Skylar MARK recommendations that she sees a therapist.  There is not a therapist in this office at this time. Contacted pt's insurance and got options for therapist.  Pt chose [facility] as it is closer to home. Called [phone] opt 1 spoke with [facility]'s intake who set up appt for 11/17/21 at 10 am (first appt available as pt said it had to be on a Wednesday or Thursday) with Nakita Saavedra who is located in the [facility] office. She reports they are only doing zoom/phone appt at this time. She reported that her insurance covers their services. Pt given all of the above info. Referral faxed to include face sheet, insurance card and last 2 office visits by the medical provider to the [facility] office at [phone].

## 2021-10-22 NOTE — TELEPHONE ENCOUNTER
Called pt's insurance to get benefits as requested by the pt-insurance is new to her. Called [employer] [place] at [phone] spoke with Kev Carlisle (ref# for call:  [id number]) She reported this policy was active on 9/5/93. She reported that [address] services was out of network and pt does not have out of network benefits. She said that a single case agreement could be requested by going on their website. She also provided psychiatrist and therapist within 60 miles on the provider list-there were only 2 of each-list is scanned in under media. Contacted the pt and reviewed the above info. Pt wants the office to ask the insurance for a single case agreement and continue med mgmt here. Pt is aware that the insurance will contact her when determination is made if they are going to cover. Pt has also been given the financial assist forms to fill out. She reported that she does not have any income.  email requesting [facility] will be sent today.

## 2021-10-27 ENCOUNTER — HOSPITAL ENCOUNTER (OUTPATIENT)
Dept: PAIN MANAGEMENT | Age: 46
Discharge: HOME | End: 2021-10-27
Payer: COMMERCIAL

## 2021-10-27 VITALS
DIASTOLIC BLOOD PRESSURE: 71 MMHG | SYSTOLIC BLOOD PRESSURE: 123 MMHG | TEMPERATURE: 97 F | HEIGHT: 67 IN | WEIGHT: 293 LBS | BODY MASS INDEX: 45.99 KG/M2 | HEART RATE: 65 BPM | OXYGEN SATURATION: 97 %

## 2021-10-27 DIAGNOSIS — G89.29 CHRONIC BILATERAL LOW BACK PAIN WITHOUT SCIATICA: ICD-10-CM

## 2021-10-27 DIAGNOSIS — M54.50 CHRONIC BILATERAL LOW BACK PAIN WITHOUT SCIATICA: ICD-10-CM

## 2021-10-27 PROCEDURE — 99203 OFFICE O/P NEW LOW 30 MIN: CPT | Performed by: NURSE PRACTITIONER

## 2021-10-27 PROCEDURE — 99215 OFFICE O/P EST HI 40 MIN: CPT

## 2021-10-27 ASSESSMENT — PAIN SCALES - GENERAL: PAINLEVEL_OUTOF10: 8

## 2021-10-27 ASSESSMENT — PAIN DESCRIPTION - PAIN TYPE: TYPE: CHRONIC PAIN

## 2021-10-27 ASSESSMENT — PAIN DESCRIPTION - LOCATION: LOCATION: BACK

## 2021-10-27 NOTE — H&P
yourself or others? 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  insulin 70-30 (HUMULIN;NOVOLIN) (70-30) 100 UNIT per ML injection vial Inject 40 Units into the skin Take 40 units in the morning and 55 units at bedtime      metFORMIN (GLUCOPHAGE) 1000 MG tablet Take 1,000 mg by mouth 2 times daily (with meals)      amLODIPine (NORVASC) 10 MG tablet Take 10 mg by mouth daily      albuterol sulfate  (90 Base) MCG/ACT inhaler Inhale 2 puffs into the lungs every 6 hours as needed for Wheezing      Semaglutide, 1 MG/DOSE, (OZEMPIC, 1 MG/DOSE,) 2 MG/1.5ML SOPN Inject 1.5 mLs into the skin once a week      atorvastatin (LIPITOR) 20 MG tablet Take 20 mg by mouth daily      Cholecalciferol (VITAMIN D3) 1.25 MG (73110 UT) CAPS Take 1 capsule by mouth once a week      empagliflozin (JARDIANCE) 25 MG tablet Take 25 mg by mouth daily      meloxicam (MOBIC) 15 MG tablet Take 15 mg by mouth 2 times daily       No current facility-administered medications for this encounter.        Social History    Social History     Tobacco Use    Smoking status: Never Smoker    Smokeless tobacco: Never Used   Substance Use Topics    Alcohol use: Never         Family History  family history is activity  Patient's Stated Pain Goal: No pain  Pain Intervention(s): Medication (see eMar), Repositioning, Rest, Ice    Current PE    ORT Score: 3    PHQ-9 Score: 10    Physical Exam:    Vitals:    10/27/21 1343   BP: 123/71   Pulse: 65   Temp: 97 °F (36.1 °C)   TempSrc: Temporal   SpO2: 97%   Weight: 300 lb (136.1 kg)   Height: 5' 7\" (1.702 m)       Body mass index is 46.99 kg/m². General Appearance: No acute distress. Appears to be well dressed  Skin Exam: Warm and dry, no jaundice, rashes or leasions  Head Exam: NCAT, PERRLA, EOMI, scalp normal  Eye Exam: PERRLA, EOMI, conjunctivae clear  Ear Exam: Normal external auricles. No drainage from ear canals  Nose Exam: Normal alignment. Turbinates clear. No drainage  Mouth Exam: Oral mucosa pink and moist. Gums pink. Throat Exam: Posterior pharynx pink in color with no edema  Neck Exam: Supple, trachea midline. No masses palpated. Respiratory Exam: Clear to ausculation in all lobes anterior and posterior. Cardiovascular Exam: Regular rate and rhythm, no gallops, no rubs or murmurs. No edema in extremities  Gastrointestinal Exam: Bowel sounds in all quadrants, soft, non-distended, non-tender with palpation, no guarding   Musculoskeletal Exam: No joint swelling or deformity.    Back Exam: tender across low back with palpation  Hip Exam: Full rotation bilateral  Shoulder Exam: Full rotation bilateral  Knee Exam: Full flexion and extension bilateral  Extremities: No rash, cyanosis or bruising  Neurologic Exam: Gait and coordination normal, speech normal and clear  Reflexes: Normal brachialis, Negative Fierro's bilateral. Normal Patellar bilateral,   CN EXAM: II-XII intact, face symmetrical, tongue symmetrical, the trapezius and sternocleidomastoid muscle appearance and strength symmetrical, normal achilles bilateral, ankle clonus negative bilateral  Strength: 5/5 RUE Bi's/Tri's, 5/5 LUE Bi's/Tri's, 5/5 RLE knee flex/ext, 5/5 RLE DF/PF, 5/5 LLE knee flex/ext, 5/5 LLE DF/PF  Sensation: Equal and intact to fine touch in all extremities  Mood and affect: Normal limits  Nurses note reviewed along with current vital signs    Active Problem(s)  Active Problems:    Chronic bilateral low back pain without sciatica  Resolved Problems:    * No resolved hospital problems. *                                                                                                                                 PLAN:  1. Patient is to call the office with any questions or concerns that may arise prior to next appointment. 2. X-rays hips, bilateral SI and flex and extension of lumbar spine  3. Order for PT at [facility]  4. F/U in 10 weeks      Urine Drug Screen Today:   Yes  []    No  [x]     Discussion:  Discussed exam findings and plan of care with patient. Patient agreed with the current plan of care at this time. All questions from the patient were answered by the provider. Activity:   Discussed exercise as beneficial to pain reduction, encouraged stretching exercise with a focus on torso strengthening. Education Provided:  Review of Adia Keith [x]  Agreement Review  [x]  Reviewed PHQ-9  [x]      Reviewed ORT [x]  Review of Test  [x]  Compliance Issues Discussed [x]   Cognitive Behavior Needs  []  Exercise  [x]  Financial Issues  []   Tobacco/Alcohol Use  []  Teaching  [x]   New Patient Picture Obtained  [x]      [] Benzodiazapine's and Narcotics:  Patient educated on the possible effects of combining Benzodiazapine's and Opioids. Explained \"Black Box Warnings\" such as; possible suppressed breathing, hypoxia, anoxia, depressed cognition, heart arrhythmia, coma and possible death. Patient verbalized understanding concerning possible effects. Controlled Substance Monitoring:   Attestation: The JOAQUIM report for this patient was reviewed today. Discussed with patient possible medication side effects, risk of tolerance, dependence and alternative treatments. Discussed thegrowing epidemic in the [place] with the overprescribing and at times the abuse of narcotics. Discussed the detrimental effects of long term narcotic use. Patient encouraged to set daily goals of exercising and decreasing dailynarcotic intake. Discussed with the patient about the development of hyperalgesia with long term narcotic intake. EMR dragon/transcription disclaimer: Much of this encounter note is electronic transcription/translation of spoken language to printed tach. Electronic translation of spoken language may be erroneous, or at times, nonsensical words or phrases may be inadvertently transcribed. Although, I have reviewed the note for such errors, some may still exist.    CC:  Sophy Nix MD    Thank you for this kind referral and allowing me to participate    in your patients care.     JAS Saleh, 10/27/2021 at 2:28 PM

## 2021-10-27 NOTE — PROGRESS NOTES
Clinic Documentation      Education Provided:  [x] Review of Jaramillo Aki  [x] Agreement Review  [x] PEG Score Calculated [x] PHQ Score Calculated [x] ORT Score Calculated    [] Compliance Issues Discussed [] Cognitive Behavior Needs [x] Exercise [] Review of Test [] Financial Issues  [x] Tobacco/Alcohol Use Reviewed [x] Teaching [x] New Patient [x] Picture Obtained    Physician Plan:  [x] Outgoing Referral  [] Pharmacy Consult  [x] Test Ordered [] Prescription Ordered/Changed   [] Obtained Test Results / Consult Notes        Complete if patient is withholding blood thinner for procedure     Blood Thinner Patient is currently taking:      [] Plavix (Hold for 7 days)  [] Aspirin (Hold for 5 days)     [] Pletal (Hold for 2 days)  [] Pradaxa (Hold for 3 days)    [] Effient (Hold for 7 days)  [] Xarelto (Hold for 2 days)    [] Eliquis (Hold for 2 days)  [] Brilinta (Hold for 7 days)    [] Coumadin (Hold for 5 days) - (INR needs to be drawn the day prior to procedure- INR < 2.0)    [] Aggrenox (Hold for 7 days)        [] Patient will stop medication on their own.    [] Blood Thinner Form Faxed for approval to hold.    Provider form faxed to:   Assessment Completed by:  Electronically signed by Guy Crespo on 10/27/2021 at 2:05 PM

## 2021-11-17 DIAGNOSIS — G89.29 CHRONIC BILATERAL LOW BACK PAIN WITHOUT SCIATICA: Primary | ICD-10-CM

## 2021-11-17 DIAGNOSIS — M54.50 CHRONIC BILATERAL LOW BACK PAIN WITHOUT SCIATICA: Primary | ICD-10-CM

## 2021-12-08 ENCOUNTER — TELEPHONE (OUTPATIENT)
Dept: PSYCHIATRY | Age: 46
End: 2021-12-08

## 2021-12-08 NOTE — TELEPHONE ENCOUNTER
Called and left vm reminding pt of appt fpr [email or web address]      Electronically signed by Leela Fatima on 12/8/2021 at 1:19 PM

## 2021-12-09 ENCOUNTER — TELEPHONE (OUTPATIENT)
Dept: PSYCHIATRY | Age: 46
End: 2021-12-09

## 2021-12-09 NOTE — TELEPHONE ENCOUNTER
Called pt for appointment reminder.   -left voicemail, requesting a return call      Electronically signed by Tanesha Carpio on 12/9/2021 at 3:59 PM

## 2022-01-11 ENCOUNTER — TELEPHONE (OUTPATIENT)
Dept: PAIN MANAGEMENT | Age: 47
End: 2022-01-11

## 2022-08-03 NOTE — TELEPHONE ENCOUNTER
Called pt to reschedule appt on 6/23 per providers request.  Rescheduled for 7/20.       No answer and left VM    Electronically signed by Suman Hair MA on 6/22/2021 at 2:01 PM Radiology Tech Note            Time Out    Time Out Performed: Yes, @ 1035 by sc      Procedure Start Time: 1035     Procedure End Time: 1042     Radiation Safety Adhered: N/A     Radiation Dose: N/A    Correct Patient: Yes    Correct Procedure: Yes    Site marking visible or correct site verified: Yes    Consent accurate and signed: Yes    Required images, implants, and equipment available: Yes    Patient allergies: Yes, verified    Fire risk assessment and special precautions: Assessed    Code status addressed (for non-full code patients): Addressed    Antibiotic dose and time given, if appropriate: N/A    Beta blocker given, if ordered: N/A    Team invited to voice any concerns: Yes    _________________________________    Sign Out    Final procedure performed: Yes    Specimens removed: N/A     Specimen labeled with correct patient identifiers: N/A    Correct specimen testing ordered: N/A

## 2024-06-03 NOTE — TELEPHONE ENCOUNTER
Fax sent to [facility] at [phone] to appeal the denial of Rebecca PA-letter and [address] [facility] last office note also faxed.
normal balance